# Patient Record
Sex: FEMALE | Race: WHITE | Employment: OTHER | URBAN - METROPOLITAN AREA
[De-identification: names, ages, dates, MRNs, and addresses within clinical notes are randomized per-mention and may not be internally consistent; named-entity substitution may affect disease eponyms.]

---

## 2019-03-14 ENCOUNTER — HOSPITAL ENCOUNTER (OUTPATIENT)
Dept: LAB | Age: 64
Discharge: HOME | End: 2019-03-14
Payer: COMMERCIAL

## 2019-03-14 ENCOUNTER — OFFICE VISIT (OUTPATIENT)
Dept: FAMILY MEDICINE CLINIC | Age: 64
End: 2019-03-14

## 2019-03-14 VITALS
TEMPERATURE: 98 F | DIASTOLIC BLOOD PRESSURE: 68 MMHG | HEIGHT: 63 IN | BODY MASS INDEX: 49.19 KG/M2 | SYSTOLIC BLOOD PRESSURE: 137 MMHG | WEIGHT: 277.6 LBS | OXYGEN SATURATION: 91 % | HEART RATE: 79 BPM | RESPIRATION RATE: 18 BRPM

## 2019-03-14 DIAGNOSIS — J45.909 UNCOMPLICATED ASTHMA, UNSPECIFIED ASTHMA SEVERITY, UNSPECIFIED WHETHER PERSISTENT: ICD-10-CM

## 2019-03-14 DIAGNOSIS — I50.30 DIASTOLIC CONGESTIVE HEART FAILURE, UNSPECIFIED HF CHRONICITY (HCC): ICD-10-CM

## 2019-03-14 DIAGNOSIS — M19.90 ARTHRITIS: ICD-10-CM

## 2019-03-14 DIAGNOSIS — F32.A DEPRESSION, UNSPECIFIED DEPRESSION TYPE: ICD-10-CM

## 2019-03-14 DIAGNOSIS — K21.9 GASTROESOPHAGEAL REFLUX DISEASE, ESOPHAGITIS PRESENCE NOT SPECIFIED: ICD-10-CM

## 2019-03-14 DIAGNOSIS — G56.03 BILATERAL CARPAL TUNNEL SYNDROME: ICD-10-CM

## 2019-03-14 DIAGNOSIS — M85.80 OSTEOPENIA, UNSPECIFIED LOCATION: ICD-10-CM

## 2019-03-14 DIAGNOSIS — E78.00 HYPERCHOLESTEROLEMIA: ICD-10-CM

## 2019-03-14 DIAGNOSIS — K42.9 UMBILICAL HERNIA WITHOUT OBSTRUCTION AND WITHOUT GANGRENE: ICD-10-CM

## 2019-03-14 DIAGNOSIS — G47.30 SLEEP APNEA, UNSPECIFIED TYPE: Primary | ICD-10-CM

## 2019-03-14 DIAGNOSIS — R73.03 PREDIABETES: ICD-10-CM

## 2019-03-14 DIAGNOSIS — Z13.29 SCREENING FOR THYROID DISORDER: ICD-10-CM

## 2019-03-14 DIAGNOSIS — I10 ESSENTIAL HYPERTENSION: ICD-10-CM

## 2019-03-14 DIAGNOSIS — J44.9 CHRONIC OBSTRUCTIVE PULMONARY DISEASE, UNSPECIFIED COPD TYPE (HCC): ICD-10-CM

## 2019-03-14 PROBLEM — E66.01 OBESITY, MORBID (HCC): Status: ACTIVE | Noted: 2019-03-14

## 2019-03-14 LAB
ALBUMIN SERPL-MCNC: 4 G/DL (ref 3.4–5)
ALBUMIN/GLOB SERPL: 1.2 {RATIO} (ref 0.8–1.7)
ALP SERPL-CCNC: 120 U/L (ref 45–117)
ALT SERPL-CCNC: 21 U/L (ref 13–56)
ANION GAP SERPL CALC-SCNC: 7 MMOL/L (ref 3–18)
AST SERPL-CCNC: 11 U/L (ref 15–37)
BASOPHILS # BLD: 0.1 K/UL (ref 0–0.1)
BASOPHILS NFR BLD: 1 % (ref 0–2)
BILIRUB SERPL-MCNC: 0.3 MG/DL (ref 0.2–1)
BUN SERPL-MCNC: 10 MG/DL (ref 7–18)
BUN/CREAT SERPL: 14 (ref 12–20)
CALCIUM SERPL-MCNC: 9 MG/DL (ref 8.5–10.1)
CHLORIDE SERPL-SCNC: 100 MMOL/L (ref 100–108)
CO2 SERPL-SCNC: 32 MMOL/L (ref 21–32)
CREAT SERPL-MCNC: 0.7 MG/DL (ref 0.6–1.3)
DIFFERENTIAL METHOD BLD: ABNORMAL
EOSINOPHIL # BLD: 0.3 K/UL (ref 0–0.4)
EOSINOPHIL NFR BLD: 3 % (ref 0–5)
ERYTHROCYTE [DISTWIDTH] IN BLOOD BY AUTOMATED COUNT: 15.2 % (ref 11.6–14.5)
GLOBULIN SER CALC-MCNC: 3.4 G/DL (ref 2–4)
GLUCOSE SERPL-MCNC: 101 MG/DL (ref 74–99)
HCT VFR BLD AUTO: 49.8 % (ref 35–45)
HGB BLD-MCNC: 15 G/DL (ref 12–16)
LYMPHOCYTES # BLD: 2.4 K/UL (ref 0.9–3.6)
LYMPHOCYTES NFR BLD: 24 % (ref 21–52)
MCH RBC QN AUTO: 28.7 PG (ref 24–34)
MCHC RBC AUTO-ENTMCNC: 30.1 G/DL (ref 31–37)
MCV RBC AUTO: 95.2 FL (ref 74–97)
MONOCYTES # BLD: 0.6 K/UL (ref 0.05–1.2)
MONOCYTES NFR BLD: 6 % (ref 3–10)
NEUTS SEG # BLD: 6.5 K/UL (ref 1.8–8)
NEUTS SEG NFR BLD: 66 % (ref 40–73)
PLATELET # BLD AUTO: 245 K/UL (ref 135–420)
PMV BLD AUTO: 10.2 FL (ref 9.2–11.8)
POTASSIUM SERPL-SCNC: 4.5 MMOL/L (ref 3.5–5.5)
PROT SERPL-MCNC: 7.4 G/DL (ref 6.4–8.2)
RBC # BLD AUTO: 5.23 M/UL (ref 4.2–5.3)
SODIUM SERPL-SCNC: 139 MMOL/L (ref 136–145)
TSH SERPL DL<=0.05 MIU/L-ACNC: 2.2 UIU/ML (ref 0.36–3.74)
WBC # BLD AUTO: 9.8 K/UL (ref 4.6–13.2)

## 2019-03-14 PROCEDURE — 85025 COMPLETE CBC W/AUTO DIFF WBC: CPT

## 2019-03-14 PROCEDURE — 80053 COMPREHEN METABOLIC PANEL: CPT

## 2019-03-14 PROCEDURE — 82043 UR ALBUMIN QUANTITATIVE: CPT

## 2019-03-14 PROCEDURE — 83036 HEMOGLOBIN GLYCOSYLATED A1C: CPT

## 2019-03-14 PROCEDURE — 84443 ASSAY THYROID STIM HORMONE: CPT

## 2019-03-14 PROCEDURE — 83520 IMMUNOASSAY QUANT NOS NONAB: CPT

## 2019-03-14 RX ORDER — VENLAFAXINE HYDROCHLORIDE 75 MG/1
CAPSULE, EXTENDED RELEASE ORAL DAILY
COMMUNITY
End: 2019-03-19 | Stop reason: SDUPTHER

## 2019-03-14 RX ORDER — FUROSEMIDE 20 MG/1
TABLET ORAL DAILY
COMMUNITY
End: 2019-03-19 | Stop reason: SDUPTHER

## 2019-03-14 RX ORDER — ALBUTEROL SULFATE 90 UG/1
2 AEROSOL, METERED RESPIRATORY (INHALATION) AS NEEDED
COMMUNITY
End: 2019-03-14 | Stop reason: SDUPTHER

## 2019-03-14 RX ORDER — CHOLECALCIFEROL (VITAMIN D3) 125 MCG
10 CAPSULE ORAL
COMMUNITY
End: 2019-03-19 | Stop reason: SDUPTHER

## 2019-03-14 RX ORDER — CALC/MAG/B COMPLEX/D3/HERB 61
TABLET ORAL
Qty: 90 CAP | Refills: 1 | Status: SHIPPED | OUTPATIENT
Start: 2019-03-14 | End: 2019-09-18

## 2019-03-14 RX ORDER — TRAZODONE HYDROCHLORIDE 100 MG/1
200 TABLET ORAL
COMMUNITY

## 2019-03-14 RX ORDER — AMLODIPINE BESYLATE 10 MG/1
TABLET ORAL DAILY
COMMUNITY
End: 2019-03-19 | Stop reason: SDUPTHER

## 2019-03-14 RX ORDER — LISINOPRIL AND HYDROCHLOROTHIAZIDE 12.5; 2 MG/1; MG/1
TABLET ORAL DAILY
COMMUNITY
End: 2019-03-19 | Stop reason: SDUPTHER

## 2019-03-14 RX ORDER — ASPIRIN 325 MG
325 TABLET ORAL DAILY
COMMUNITY
End: 2019-08-14 | Stop reason: ALTCHOICE

## 2019-03-14 RX ORDER — ATORVASTATIN CALCIUM 20 MG/1
TABLET, FILM COATED ORAL DAILY
COMMUNITY
End: 2019-03-19 | Stop reason: SDUPTHER

## 2019-03-14 RX ORDER — MONTELUKAST SODIUM 10 MG/1
10 TABLET ORAL DAILY
COMMUNITY
End: 2019-03-19 | Stop reason: SDUPTHER

## 2019-03-14 RX ORDER — BACLOFEN 10 MG/1
TABLET ORAL
COMMUNITY
End: 2019-04-08 | Stop reason: SDUPTHER

## 2019-03-14 RX ORDER — DEXLANSOPRAZOLE 60 MG/1
30 CAPSULE, DELAYED RELEASE ORAL
COMMUNITY
End: 2019-03-14 | Stop reason: ALTCHOICE

## 2019-03-14 RX ORDER — DIPHENHYDRAMINE HCL 25 MG
50 CAPSULE ORAL 2 TIMES DAILY
COMMUNITY
End: 2019-05-24 | Stop reason: SDUPTHER

## 2019-03-14 RX ORDER — OXYCODONE AND ACETAMINOPHEN 5; 325 MG/1; MG/1
TABLET ORAL
COMMUNITY
Start: 2020-01-08 | End: 2020-01-08

## 2019-03-14 RX ORDER — CELECOXIB 200 MG/1
CAPSULE ORAL 2 TIMES DAILY
COMMUNITY
End: 2019-05-24 | Stop reason: SDUPTHER

## 2019-03-14 RX ORDER — ALBUTEROL SULFATE 90 UG/1
2 AEROSOL, METERED RESPIRATORY (INHALATION) AS NEEDED
Qty: 1 INHALER | Refills: 2 | Status: SHIPPED | OUTPATIENT
Start: 2019-03-14 | End: 2019-05-24

## 2019-03-14 NOTE — PROGRESS NOTES
ESTABLISH CARE VISIT    SUBJECTIVE:     Chief Complaint   Patient presents with    Establish Care    Sleep Apnea     Needs a referral to see Dr Lise Paulino (sleep provider)       HPI: 58 y.o. female with PMHx significant for thyroid disease, COPD, HTN, GERD, CHF, asthma  is here for the above chief complaint(s). Asthma/COPD  Patent currently taking spiriva 18mcg BID, albuterol prn and singulair 10mg daily. Patient has been on O2 in the past, she only has a portable o2 tank now, but can't use this at night. Arthritis/Osteopenia  Patient states arthritis in multiple joints. Patient has DJD and degenerative disc disease. Patient was taking ibuprofen 800mg for 10 years and still takes it intermittently. Patient was switched to celebrex, but insurance is not paying for this, so she is just dealing with the pain. DEXA scan in 2013 showed osteopenia. Recent fracture and dislocation of right foot  Patient in boot today. She had surgery in January. Patient is in a boot for at least 2 more months. Follows up with ortho in the next 2 weeks. Sleep Apnea  Patient has sleep apnea- diagnosed by Dr. Lise Paulino several years ago. Patient has CPAP machine, but it is old. Patient takes trazodone at night for sleep. Needs referral for new machine. Hypertension  Patient takes lisinopril/HCTZ 20/12.5mg daily and amlodipine 10mg daily. Last /60- this is typical for her. She does a low salt diet. Hyperlipidemia  Patient takes atorvastatin for this. Unsure when last lipid panel was. Pre-diabetes/diabetes  Last a1c was 6.5 done in January. Diabetes is currently diet-controlled, no current medications. GERD  Patient states that she has \"severe\" GERD. She currently takes dexilant and does not feel controlled on this medication. She was taking lansoprazole in the past and felt better controlled on this medication. Patent states that she has chronically been on high dose NSAIDs for the past 10 years for arthritis.  Patient has ongoing heartburn, epigastric pain. She denies any n/v/d, rectal bleeding, melena. Patient has never had EGD. Bilateral carpal tunnel syndrome  Patient has chronic bilatearl carpal tunnel. She is managing conservatively for now. Requesting wrist splints. Diastolic congestive heart failure  Diagnosed by the ED. Last ECHO that is in her chart shows an EF of 60% Has not seen cardiologist for this. She does endorse shortness of breath with exertion and at rest.-     Hernia  Patient noticed abdominal hernia approximately 5 years ago. It seems to have grown in size. She does endorse occasional hardness and pain to the area. Health Maintenance:     Eye -  no complaints, last eye exam: in the last year, no complaints  Oral Health -  no complaints, last exam:   Hearing -  no complaints. U/A -  no UTI sxs. Cardiac- denies history, denies chest pain. Pulmonary health/Smoking history- , denies cough, SOB.- Smoking - Current smoker. 70 pack year history, qualifies for lung cancer screening. Discussed smoking cessation at length. Explained to patient that her COPD will continue to worsen if she doesn't quit smoking. She is also at very high risk for lung cancer. Patient has tried to quit in the past but finds it difficult to quit when  is still smoking. Mammo- 8/2018  DEXA  - osteopenia - 2013   Colonoscopy - 10 years ago, needs referral to GI      Review of Systems   Constitutional: Negative for chills, diaphoresis, fever, malaise/fatigue and weight loss. HENT: Negative for congestion, ear pain, hearing loss, nosebleeds, sinus pain, sore throat and tinnitus. Eyes: Negative for blurred vision, double vision, pain and discharge. Respiratory: Positive for shortness of breath and wheezing. Negative for cough and hemoptysis. Cardiovascular: Positive for orthopnea. Negative for chest pain, palpitations, claudication, leg swelling and PND.    Gastrointestinal: Positive for heartburn (h/o GERD). Negative for abdominal pain, blood in stool, constipation, diarrhea, melena, nausea and vomiting. Genitourinary: Negative for dysuria, flank pain, frequency, hematuria and urgency. Musculoskeletal: Positive for back pain and joint pain (bilaterl wrists). Negative for myalgias and neck pain. Skin: Negative for itching and rash. Neurological: Negative for dizziness, tingling, speech change, focal weakness, seizures, weakness and headaches. Endo/Heme/Allergies: Negative for polydipsia. Does not bruise/bleed easily. Psychiatric/Behavioral: Negative for depression, memory loss, substance abuse and suicidal ideas. The patient is not nervous/anxious and does not have insomnia. @[facility]  Current Outpatient Medications   Medication Sig    traZODone (DESYREL) 100 mg tablet Take 200 mg by mouth nightly.  atorvastatin (LIPITOR) 20 mg tablet Take  by mouth daily.  montelukast (SINGULAIR) 10 mg tablet Take 10 mg by mouth daily.  melatonin tab tablet Take 10 mg by mouth nightly.  furosemide (LASIX) 20 mg tablet Take  by mouth daily.  celecoxib (CELEBREX) 200 mg capsule Take  by mouth two (2) times a day.  venlafaxine-SR (EFFEXOR XR) 75 mg capsule Take  by mouth daily.  dexlansoprazole (DEXILANT) 60 mg CpDB capsule (delayed release) Take 30 mg by mouth.  pitavastatin calcium (LIVALO) 2 mg tablet Take  by mouth daily.  lisinopril-hydroCHLOROthiazide (PRINZIDE, ZESTORETIC) 20-12.5 mg per tablet Take  by mouth daily.  amLODIPine (NORVASC) 10 mg tablet Take  by mouth daily.  tiotropium (SPIRIVA WITH HANDIHALER) 18 mcg inhalation capsule Take 1 Cap by inhalation daily.  potassium 99 mg tablet Take 99 mg by mouth daily.  baclofen (LIORESAL) 10 mg tablet Take  by mouth three (3) times daily as needed.  diphenhydrAMINE (BENADRYL) 25 mg capsule Take 50 mg by mouth two (2) times a day.     oxyCODONE-acetaminophen (PERCOCET) 5-325 mg per tablet Take  by mouth every six (6) hours as needed for Pain.  albuterol (PROAIR HFA) 90 mcg/actuation inhaler Take 2 Puffs by inhalation as needed for Wheezing.  aspirin (ASPIRIN) 325 mg tablet Take 325 mg by mouth daily.  Oxygen     bipap machine kit by Does Not Apply route. No current facility-administered medications for this visit. Health Maintenance   Topic Date Due    Hepatitis C Screening  1955    DTaP/Tdap/Td series (1 - Tdap) 07/18/1975    PAP AKA CERVICAL CYTOLOGY  07/18/1975    Shingrix Vaccine Age 50> (1 of 2) 07/18/2004    BREAST CANCER SCRN MAMMOGRAM  07/18/2004    FOBT Q 1 YEAR AGE 50-75  07/18/2004    Influenza Age 5 to Adult  08/01/2018       Medications and Allergies: Reviewed and confirmed in the chart    Past Medical Hx: Reviewed and confirmed in the chart  Past Medical History:   Diagnosis Date    Arthritis     Asthma     Carpal tunnel syndrome     [facility] obstructive pulmonary disease [facility] [address]     GERD (gastroesophageal reflux disease)     Hypercholesterolemia     Hypertension     Osteopenia     Sleep apnea     Tendonitis        Patient Active Problem List   Diagnosis Code    Obesity, morbid ([facility] [address] E66.01       Family Hx, Surgical Hx, Social Hx: Reviewed and updated in EMR    OBJECTIVE:  Vitals:    03/14/19 1112   BP: 137/68   Pulse: 79   Resp: 18   Temp: 98 °F (36.7 °C)   TempSrc: Oral   SpO2: 91%   Weight: 277 lb 9.6 oz (125.9 kg)   Height: 5' 3\" (1.6 m)       BP Readings from Last 3 Encounters:   03/14/19 137/68     Wt Readings from Last 3 Encounters:   03/14/19 277 lb 9.6 oz (125.9 kg)       Physical Exam   Constitutional: She is well-developed, well-nourished, and in no distress. HENT:   Head: Normocephalic and atraumatic. Right Ear: External ear normal.   Left Ear: External ear normal.   Nose: Nose normal.   Mouth/Throat: Oropharynx is clear and moist.   Eyes: Conjunctivae and EOM are normal. Pupils are equal, round, and reactive to light. Neck: Normal range of motion.  Neck supple. No tracheal deviation present. No thyromegaly present. Cardiovascular: Normal rate, regular rhythm and intact distal pulses. Exam reveals distant heart sounds. Exam reveals no gallop and no friction rub. No murmur heard. Abdominal: Soft. Normal aorta. There is tenderness. A hernia is present. Hernia confirmed positive in the umbilical area. Tenderness in area of hernia. Musculoskeletal: Normal range of motion. She exhibits no edema, tenderness or deformity. Lymphadenopathy:     She has no cervical adenopathy. Skin: Skin is warm and dry. Psychiatric: Mood, memory, affect and judgment normal.         Nursing Notes Reviewed    ASSESSMENT AND PLAN  Diagnoses and all orders for this visit:    1. Sleep apnea, unspecified type  -     SLEEP MEDICINE REFERRAL    2. Essential hypertension       - continue with current management  Key CAD CHF Meds             atorvastatin (LIPITOR) 20 mg tablet  (Taking) Take  by mouth daily. furosemide (LASIX) 20 mg tablet  (Taking) Take  by mouth daily. pitavastatin calcium (LIVALO) 2 mg tablet  (Taking) Take  by mouth daily. lisinopril-hydroCHLOROthiazide (PRINZIDE, ZESTORETIC) 20-12.5 mg per tablet  (Taking) Take  by mouth daily. amLODIPine (NORVASC) 10 mg tablet  (Taking) Take  by mouth daily. aspirin (ASPIRIN) 325 mg tablet  (Taking) Take 325 mg by mouth daily. 3. Gastroesophageal reflux disease, esophagitis presence not specified  -     REFERRAL TO GASTROENTEROLOGY  -     METABOLIC PANEL, COMPREHENSIVE; Future  -     lansoprazole (PREVACID) 15 mg capsule; One cap po daily. 4. Bilateral carpal tunnel syndrome  -     CBC WITH AUTOMATED DIFF; Future  -     Arm Brace (NEOPRENE WRIST SPLINT SUPPORT) misc; To be worn daily. 5. Uncomplicated asthma, unspecified asthma severity, unspecified whether persistent       - See below for COPD    6.  Chronic obstructive pulmonary disease, unspecified COPD type ([facility] [address]  -     REFERRAL TO PULMONARY DISEASE  -     albuterol (PROAIR HFA) 90 mcg/actuation inhaler; Take 2 Puffs by inhalation as needed for Wheezing. 7. Diastolic congestive heart failure, unspecified HF chronicity ([address]  As above for hypertension  -     ECHO ADULT COMPLETE; Future  -     MICROALBUMIN, UR, RAND W/ MICROALB/CREAT RATIO; Future    8. Hypercholesterolemia     - Continue with lipitor 20mg daily. 9. Arthritis  -     RHEUMASSURE; Future    10. Osteopenia, unspecified location  -     DEXA BONE DENSITY STUDY AXIAL; Future    11. Umbilical hernia without obstruction and without gangrene  -      ABD LTD; Future  -     REFERRAL TO GENERAL SURGERY    12. Prediabetes  The patient is asked to make an attempt to improve diet and exercise patterns to aid in medical management of this problem. -     METABOLIC PANEL, COMPREHENSIVE; Future  -     HEMOGLOBIN A1C WITH EAG; Future  -     MICROALBUMIN, UR, RAND W/ MICROALB/CREAT RATIO; Future    13. Screening for thyroid disorder  -     TSH 3RD GENERATION; Future    Follow-up in 2-3 weeks.        Orders Placed This Encounter    traZODone (DESYREL) 100 mg tablet    atorvastatin (LIPITOR) 20 mg tablet    montelukast (SINGULAIR) 10 mg tablet    melatonin tab tablet    furosemide (LASIX) 20 mg tablet    celecoxib (CELEBREX) 200 mg capsule    venlafaxine-SR (EFFEXOR XR) 75 mg capsule    dexlansoprazole (DEXILANT) 60 mg CpDB capsule (delayed release)    pitavastatin calcium (LIVALO) 2 mg tablet    lisinopril-hydroCHLOROthiazide (PRINZIDE, ZESTORETIC) 20-12.5 mg per tablet    amLODIPine (NORVASC) 10 mg tablet    tiotropium (SPIRIVA WITH HANDIHALER) 18 mcg inhalation capsule    potassium 99 mg tablet    baclofen (LIORESAL) 10 mg tablet    diphenhydrAMINE (BENADRYL) 25 mg capsule    oxyCODONE-acetaminophen (PERCOCET) 5-325 mg per tablet    albuterol (PROAIR HFA) 90 mcg/actuation inhaler    aspirin (ASPIRIN) 325 mg tablet    Oxygen    bipap machine kit       No future appointments. AVS printed and provided to patient    Assessment and plan above discussed with patient, patient voiced understanding and agreement with plan. More than 50% of this 45 minute visit was spent face to face counseling the patient about the etiology and treatment options for the above health conditions outlined in assessment and plan     Pam Wong [place] [phone]  [facility] -   [phone]

## 2019-03-14 NOTE — PROGRESS NOTES
Chief Complaint   Patient presents with   Mary Fernandez Establish Care    Sleep Apnea     Needs a referral to see Dr Nikolas Loving (sleep provider)

## 2019-03-15 LAB
CREAT UR-MCNC: 75 MG/DL (ref 30–125)
EST. AVERAGE GLUCOSE BLD GHB EST-MCNC: 140 MG/DL
HBA1C MFR BLD: 6.5 % (ref 4.2–5.6)
MICROALBUMIN UR-MCNC: 0.7 MG/DL (ref 0–3)
MICROALBUMIN/CREAT UR-RTO: 9 MG/G (ref 0–30)

## 2019-03-19 ENCOUNTER — TELEPHONE (OUTPATIENT)
Dept: FAMILY MEDICINE CLINIC | Age: 64
End: 2019-03-19

## 2019-03-19 RX ORDER — AMLODIPINE BESYLATE 10 MG/1
10 TABLET ORAL DAILY
Qty: 90 TAB | Refills: 0 | Status: SHIPPED | OUTPATIENT
Start: 2019-03-19 | End: 2019-12-16 | Stop reason: SDUPTHER

## 2019-03-19 RX ORDER — VENLAFAXINE HYDROCHLORIDE 75 MG/1
75 CAPSULE, EXTENDED RELEASE ORAL DAILY
Qty: 90 CAP | Refills: 0 | Status: SHIPPED | OUTPATIENT
Start: 2019-03-19 | End: 2019-07-02 | Stop reason: SDUPTHER

## 2019-03-19 RX ORDER — MONTELUKAST SODIUM 10 MG/1
10 TABLET ORAL DAILY
Qty: 90 TAB | Refills: 0 | Status: SHIPPED | OUTPATIENT
Start: 2019-03-19 | End: 2019-07-02 | Stop reason: SDUPTHER

## 2019-03-19 RX ORDER — FUROSEMIDE 20 MG/1
20 TABLET ORAL DAILY
Qty: 90 TAB | Refills: 0 | Status: SHIPPED | OUTPATIENT
Start: 2019-03-19 | End: 2019-07-02 | Stop reason: SDUPTHER

## 2019-03-19 RX ORDER — CHOLECALCIFEROL (VITAMIN D3) 125 MCG
10 CAPSULE ORAL
Qty: 90 TAB | Refills: 0 | Status: SHIPPED | OUTPATIENT
Start: 2019-03-19 | End: 2019-03-20 | Stop reason: SDUPTHER

## 2019-03-19 RX ORDER — LISINOPRIL AND HYDROCHLOROTHIAZIDE 12.5; 2 MG/1; MG/1
1 TABLET ORAL DAILY
Qty: 90 TAB | Refills: 0 | Status: SHIPPED | OUTPATIENT
Start: 2019-03-19 | End: 2019-09-06 | Stop reason: SDUPTHER

## 2019-03-19 RX ORDER — ATORVASTATIN CALCIUM 20 MG/1
20 TABLET, FILM COATED ORAL DAILY
Qty: 90 TAB | Refills: 0 | Status: SHIPPED | OUTPATIENT
Start: 2019-03-19 | End: 2019-09-06 | Stop reason: SDUPTHER

## 2019-03-19 NOTE — TELEPHONE ENCOUNTER
Pt requesting all order to be faxed to [facility].  Faxed to [address] [place] [address] confirmation received

## 2019-03-20 ENCOUNTER — TELEPHONE (OUTPATIENT)
Dept: FAMILY MEDICINE CLINIC | Age: 64
End: 2019-03-20

## 2019-03-20 DIAGNOSIS — G47.30 SLEEP APNEA, UNSPECIFIED TYPE: ICD-10-CM

## 2019-03-20 RX ORDER — MELATONIN 5 MG
CAPSULE ORAL
Qty: 180 CAP | Refills: 0 | Status: SHIPPED | OUTPATIENT
Start: 2019-03-20 | End: 2019-07-02 | Stop reason: SDUPTHER

## 2019-03-22 LAB
14.3.3 ETA, RHEUM. ARTHRITIS: <0.2 NG/ML
CCP IGA+IGG SERPL IA-ACNC: <20 UNITS
RHEUMATOID FACT SERPL-ACNC: <14 UNITS/ML

## 2019-04-03 ENCOUNTER — TELEPHONE (OUTPATIENT)
Dept: FAMILY MEDICINE CLINIC | Age: 64
End: 2019-04-03

## 2019-04-08 ENCOUNTER — OFFICE VISIT (OUTPATIENT)
Dept: FAMILY MEDICINE CLINIC | Age: 64
End: 2019-04-08

## 2019-04-08 VITALS
HEIGHT: 63 IN | WEIGHT: 273.6 LBS | SYSTOLIC BLOOD PRESSURE: 132 MMHG | TEMPERATURE: 99.1 F | OXYGEN SATURATION: 92 % | DIASTOLIC BLOOD PRESSURE: 63 MMHG | HEART RATE: 76 BPM | BODY MASS INDEX: 48.48 KG/M2 | RESPIRATION RATE: 18 BRPM

## 2019-04-08 DIAGNOSIS — J34.2 DEVIATED SEPTUM: Primary | ICD-10-CM

## 2019-04-08 DIAGNOSIS — E11.8 CONTROLLED DIABETES MELLITUS TYPE 2 WITH COMPLICATIONS, UNSPECIFIED WHETHER LONG TERM INSULIN USE (HCC): ICD-10-CM

## 2019-04-08 DIAGNOSIS — M62.830 MUSCLE SPASM OF BACK: ICD-10-CM

## 2019-04-08 RX ORDER — METFORMIN HYDROCHLORIDE 500 MG/1
TABLET ORAL
Qty: 270 TAB | Refills: 1 | Status: SHIPPED | OUTPATIENT
Start: 2019-04-08

## 2019-04-08 RX ORDER — BACLOFEN 10 MG/1
10 TABLET ORAL 3 TIMES DAILY
Qty: 90 TAB | Refills: 1 | Status: SHIPPED | OUTPATIENT
Start: 2019-04-08 | End: 2019-06-07 | Stop reason: SDUPTHER

## 2019-04-08 NOTE — PROGRESS NOTES
Chief Complaint   Patient presents with    Results     lab     1. Have you been to the ER, urgent care clinic since your last visit? Hospitalized since your last visit? No    2. Have you seen or consulted any other health care providers outside of the [address] [place] [address] since your last visit? Include any pap smears or colon screening.  No

## 2019-04-08 NOTE — PATIENT INSTRUCTIONS
Noninsulin Medicines for Type 2 Diabetes: Care Instructions  Your Care Instructions    There are different types of noninsulin medicines for diabetes. Each works in a different way. But they all help you control your blood sugar. Some types help your body make insulin to lower your blood sugar. Others lower how much insulin your body needs. Some can slow how fast your body digests sugars. And some can remove extra glucose through your urine. · Alpha-glucosidase inhibitors. These keep starches from breaking down. This means that they lower the amount of glucose absorbed when you eat. They don't help your body make more insulin. So they will not cause low blood sugar unless you use them with other medicines for diabetes. They include acarbose and miglitol. · DPP-4 inhibitors. These help your body raise the level of insulin after you eat. They also help your body make less of a hormone that raises blood sugar. They include linagliptin, saxagliptin, and sitagliptin. · Incretin hormones (GLP-1 receptor agonists). Your body makes a protein that can raise your insulin level. It also can lower your blood sugar and make you less hungry. You can get shots of hormones that work the same way. They include exenatide and liraglutide. · Meglitinides. These help your body release insulin. They also help slow how your body digests sugars. So they can keep your blood sugar from rising too fast after you eat. They include nateglinide and repaglinide. · Metformin. This lowers how much glucose your liver makes. And it helps you respond better to insulin. It also lowers the amount of stored sugar that your liver releases when you are not eating. · SGLT2 inhibitors. These help to remove extra glucose through your urine. They may also help some people lose weight. They include canagliflozin, dapagliflozin, and empagliflozin. · Sulfonylureas. These help your body release more insulin. Some work for many hours.  They can cause low blood sugar if you don't eat as you planned. They include glipizide and glyburide. · Thiazolidinediones. These reduce the amount of blood glucose. They also help you respond better to insulin. They include pioglitazone and rosiglitazone. You may need to take more than one medicine for diabetes. Two or more medicines may work better to lower your blood sugar level than just one does. Follow-up care is a key part of your treatment and safety. Be sure to make and go to all appointments, and call your doctor if you are having problems. It's also a good idea to know your test results and keep a list of the medicines you take. How can you care for yourself at home? · Eat a healthy diet. Get some exercise each day. This may help you to reduce how much medicine you need. · Do not take other prescription or over-the-counter medicines, vitamins, herbal products, or supplements without talking to your doctor first. Some medicines for type 2 diabetes can cause problems with other medicines or supplements. · Tell your doctor if you plan to get pregnant. Some of these drugs are not safe for pregnant women. · Be safe with medicines. Take your medicines exactly as prescribed. Meglitinides and sulfonylureas can cause your blood sugar to drop very low. Call your doctor if you think you are having a problem with your medicine. · Check your blood sugar often. You can use a glucose monitor. Keeping track can help you know how certain foods, activities, and medicines affect your blood sugar. And it can help you keep your blood sugar from getting so low that it's not safe. When should you call for help? Call 911 anytime you think you may need emergency care.  For example, call if:    · You passed out (lost consciousness).     · You are confused or cannot think clearly.     · Your blood sugar is very high or very low.    Watch closely for changes in your health, and be sure to contact your doctor if:    · Your blood sugar stays outside the level your doctor set for you.     · You have any problems. Where can you learn more? Go to http://morgan-laz.info/. Enter H153 in the search box to learn more about \"Noninsulin Medicines for Type 2 Diabetes: Care Instructions. \"  Current as of: July 25, 2018  Content Version: 11.9  © 8356-0052 [employer]. Care instructions adapted under license by [employer] (which disclaims liability or warranty for this information). If you have questions about a medical condition or this instruction, always ask your healthcare professional. [address] any warranty or liability for your use of this information. Learning About Diabetes Food Guidelines  Your Care Instructions    Meal planning is important to manage diabetes. It helps keep your blood sugar at a target level (which you set with your doctor). You don't have to eat special foods. You can eat what your family eats, including sweets once in a while. But you do have to pay attention to how often you eat and how much you eat of certain foods. You may want to work with a dietitian or a certified diabetes educator (CDE) to help you plan meals and snacks. A dietitian or CDE can also help you lose weight if that is one of your goals. What should you know about eating carbs? Managing the amount of carbohydrate (carbs) you eat is an important part of healthy meals when you have diabetes. Carbohydrate is found in many foods. · Learn which foods have carbs. And learn the amounts of carbs in different foods. ? Bread, cereal, pasta, and rice have about 15 grams of carbs in a serving. A serving is 1 slice of bread (1 ounce), ½ cup of cooked cereal, or 1/3 cup of cooked pasta or rice. ? Fruits have 15 grams of carbs in a serving.  A serving is 1 small fresh fruit, such as an apple or orange; ½ of a banana; ½ cup of cooked or canned fruit; ½ cup of fruit juice; 1 cup of melon or raspberries; or 2 tablespoons of dried fruit. ? Milk and no-sugar-added yogurt have 15 grams of carbs in a serving. A serving is 1 cup of milk or 2/3 cup of no-sugar-added yogurt. ? Starchy vegetables have 15 grams of carbs in a serving. A serving is ½ cup of mashed potatoes or sweet potato; 1 cup winter squash; ½ of a small baked potato; ½ cup of cooked beans; or ½ cup cooked corn or green peas. · Learn how much carbs to eat each day and at each meal. A dietitian or CDE can teach you how to keep track of the amount of carbs you eat. This is called carbohydrate counting. · If you are not sure how to count carbohydrate grams, use the Plate Method to plan meals. It is a good, quick way to make sure that you have a balanced meal. It also helps you spread carbs throughout the day. ? Divide your plate by types of foods. Put non-starchy vegetables on half the plate, meat or other protein food on one-quarter of the plate, and a grain or starchy vegetable in the final quarter of the plate. To this you can add a small piece of fruit and 1 cup of milk or yogurt, depending on how many carbs you are supposed to eat at a meal.  · Try to eat about the same amount of carbs at each meal. Do not \"save up\" your daily allowance of carbs to eat at one meal.  · Proteins have very little or no carbs per serving. Examples of proteins are beef, chicken, turkey, fish, eggs, tofu, cheese, cottage cheese, and peanut butter. A serving size of meat is 3 ounces, which is about the size of a deck of cards. Examples of meat substitute serving sizes (equal to 1 ounce of meat) are 1/4 cup of cottage cheese, 1 egg, 1 tablespoon of peanut butter, and ½ cup of tofu. How can you eat out and still eat healthy? · Learn to estimate the serving sizes of foods that have carbohydrate. If you measure food at home, it will be easier to estimate the amount in a serving of restaurant food.   · If the meal you order has too much carbohydrate (such as potatoes, corn, or baked beans), ask to have a low-carbohydrate food instead. Ask for a salad or green vegetables. · If you use insulin, check your blood sugar before and after eating out to help you plan how much to eat in the future. · If you eat more carbohydrate at a meal than you had planned, take a walk or do other exercise. This will help lower your blood sugar. What else should you know? · Limit saturated fat, such as the fat from meat and dairy products. This is a healthy choice because people who have diabetes are at higher risk of heart disease. So choose lean cuts of meat and nonfat or low-fat dairy products. Use olive or canola oil instead of butter or shortening when cooking. · Don't skip meals. Your blood sugar may drop too low if you skip meals and take insulin or certain medicines for diabetes. · Check with your doctor before you drink alcohol. Alcohol can cause your blood sugar to drop too low. Alcohol can also cause a bad reaction if you take certain diabetes medicines. Follow-up care is a key part of your treatment and safety. Be sure to make and go to all appointments, and call your doctor if you are having problems. It's also a good idea to know your test results and keep a list of the medicines you take. Where can you learn more? Go to http://morgan-laz.info/. Enter W846 in the search box to learn more about \"Learning About Diabetes Food Guidelines. \"  Current as of: July 25, 2018  Content Version: 11.9  © 7648-7486 [employer]. Care instructions adapted under license by [employer] (which disclaims liability or warranty for this information). If you have questions about a medical condition or this instruction, always ask your healthcare professional. [place] [address] any warranty or liability for your use of this information.

## 2019-04-08 NOTE — PROGRESS NOTES
Follow Up Visit Note    Chief Complaint   Patient presents with    Results     lab       HPI:  Roxie Cano is a 58 y.o.  female  has a past medical history of Arthritis, Asthma, Carpal tunnel syndrome, Chronic obstructive pulmonary disease ([facility] [address] Congestive heart failure (CHF) ([facility] [address] COPD (chronic obstructive pulmonary disease) ([facility] [address] GERD (gastroesophageal reflux disease), Hypercholesterolemia, Hypertension, Hypothyroid, Osteopenia, Sleep apnea, Tendonitis, and Thyroid disease. is here for the above complaint(s). Lab Review:  Sodium 139   136 - 145 mmol/L Final   Potassium 4.5   3.5 - 5.5 mmol/L Final   Chloride 100   100 - 108 mmol/L Final   CO2 32   21 - 32 mmol/L Final   Anion gap 7   3.0 - 18 mmol/L Final   Glucose 101  High   74 - 99 mg/dL Final   BUN 10   7.0 - 18 MG/DL Final   Creatinine 0.70   0.6 - 1.3 MG/DL Final   BUN/Creatinine ratio 14   12 - 20   Final   GFR est AA >60   >60 ml/min/1.73m2 Final   GFR est non-AA >60   >60 ml/min/1.73m2 Final     Discussed patient's elevated a1c with her. She states that her a1c has been \"borderline\" in the past and her previous PCP has been monitoring this. She is not currently taking anything for pre-diabetes/diabetes at this time. Patient denies frequent urination, excessive thirst, hypoglycemic events (lightheadedness/dizziness), nausea, vomiting, shakiness. Hemoglobin A1c 6.5  High   4.2 - 5.6 % Final     Low back pain:  Patient states arthritis in multiple joints, including DJD and degenerative disc disease. Patient is having acute pain today and endorses 6/10 pain in her lower back, radiates down her right side. Pain comes and goes and is stabbing/throbbing in nature when it hits her. She states that movement makes this pain worse as well as pain after she has been sitting for awhile and moves to standing position. Patient denies any numbness/tingling in her legs or feet. Deviated Nasal Septum  Patient states this is chronic.  She endorses snoring and sleep apnea-like symptoms. She is requesting referral for ENT for evaluation. She states that she has difficulty breathing through both sides of her nose. She denies any pain, sinus congestion or tenderness. Review of Systems   Constitutional: Negative for chills, fever, malaise/fatigue and weight loss. Eyes: Negative for blurred vision, double vision and pain. Respiratory: Negative for cough, sputum production, shortness of breath and wheezing. Cardiovascular: Negative for chest pain, palpitations, orthopnea, claudication and leg swelling. Gastrointestinal: Negative for abdominal pain, constipation, diarrhea, nausea and vomiting. Genitourinary: Negative for dysuria, frequency and urgency. Neurological: Negative for dizziness, tingling and headaches. Current Outpatient Medications   Medication Sig    melatonin 5 mg cap capsule Take 2 capsules by mouth at bedtime.  atorvastatin (LIPITOR) 20 mg tablet Take 1 Tab by mouth daily.  montelukast (SINGULAIR) 10 mg tablet Take 1 Tab by mouth daily.  furosemide (LASIX) 20 mg tablet Take 1 Tab by mouth daily.  venlafaxine-SR (EFFEXOR XR) 75 mg capsule Take 1 Cap by mouth daily.  lisinopril-hydroCHLOROthiazide (PRINZIDE, ZESTORETIC) 20-12.5 mg per tablet Take 1 Tab by mouth daily.  amLODIPine (NORVASC) 10 mg tablet Take 1 Tab by mouth daily.  tiotropium (SPIRIVA WITH HANDIHALER) 18 mcg inhalation capsule Take 1 Cap by inhalation daily.  potassium 99 mg tablet Take 1 Tab by mouth daily.  celecoxib (CELEBREX) 200 mg capsule Take  by mouth two (2) times a day.  baclofen (LIORESAL) 10 mg tablet Take  by mouth three (3) times daily as needed.  diphenhydrAMINE (BENADRYL) 25 mg capsule Take 50 mg by mouth two (2) times a day.  aspirin (ASPIRIN) 325 mg tablet Take 325 mg by mouth daily.  albuterol (PROAIR HFA) 90 mcg/actuation inhaler Take 2 Puffs by inhalation as needed for Wheezing.     lansoprazole (PREVACID) 15 mg capsule One cap po daily.  traZODone (DESYREL) 100 mg tablet Take 200 mg by mouth nightly.  traZODone (DESYREL) 100 mg tablet Take 200 mg by mouth nightly.  oxyCODONE-acetaminophen (PERCOCET) 5-325 mg per tablet Take  by mouth every six (6) hours as needed for Pain.  Oxygen     bipap machine kit by Does Not Apply route.  Arm Brace (NEOPRENE WRIST SPLINT SUPPORT) misc To be worn daily.  celecoxib (CELEBREX) 200 mg capsule Take 200 mg by mouth two (2) times a day.  diphenhydrAMINE (BENADRYL) 25 mg capsule Take 25 mg by mouth every six (6) hours as needed.  oxyCODONE-acetaminophen (PERCOCET) 5-325 mg per tablet Take 1 Tab by mouth every six (6) hours as needed for Pain. Max Daily Amount: 4 Tabs.  lisinopril-hydroCHLOROthiazide (PRINZIDE, ZESTORETIC) 10-12.5 mg per tablet Take 1 Tab by mouth daily.  albuterol (PROAIR HFA) 90 mcg/actuation inhaler Take 2 Puffs by inhalation every six (6) hours as needed for Wheezing. No current facility-administered medications for this visit. Health Maintenance   Topic Date Due    Hepatitis C Screening  1955    FOOT EXAM Q1  07/18/1964    EYE EXAM RETINAL OR DILATED  07/18/1964    PAP AKA CERVICAL CYTOLOGY  07/18/1975    Shingrix Vaccine Age 50> (1 of 2) 07/18/2004    FOBT Q 1 YEAR AGE 50-75  07/18/2004    LIPID PANEL Q1  07/13/2019    Influenza Age 5 to Adult  08/01/2019    HEMOGLOBIN A1C Q6M  09/14/2019    DTaP/Tdap/Td series (2 - Td) 12/07/2019    MICROALBUMIN Q1  03/14/2020    BREAST CANCER SCRN MAMMOGRAM  09/20/2020    Pneumococcal 0-64 years  Completed     Immunization History   Administered Date(s) Administered    Influenza Vaccine (Quad) PF 12/10/2016       Allergies and Medications: Reviewed and updated in EMR.      Past Medical History:   Diagnosis Date    Arthritis     Asthma     Carpal tunnel syndrome     Chronic obstructive pulmonary disease (HCC)     Congestive heart failure (CHF) (HCC)     COPD (chronic obstructive pulmonary disease) (HCC)     GERD (gastroesophageal reflux disease)     Hypercholesterolemia     Hypertension     Hypothyroid     Osteopenia     Sleep apnea     Tendonitis     Thyroid disease        Surgical History: Reviewed and updated in EMR as appropriate. Social History: Reviewed and updated in EMR as appropriate. Family History: Reviewed and updated in EMR as appropriate. OBJECTIVE:   Visit Vitals  /63   Pulse 76   Temp 99.1 °F (37.3 °C) (Oral)   Resp 18   Ht 5' 3\" (1.6 m)   Wt 273 lb 9.6 oz (124.1 kg)   SpO2 92%   BMI 48.47 kg/m²        Physical Exam   Constitutional: She is oriented to person, place, and time and well-developed, well-nourished, and in no distress. No distress. Neck: Normal range of motion. Neck supple. No thyromegaly present. Cardiovascular: Normal rate, regular rhythm, normal heart sounds and intact distal pulses. Exam reveals no gallop and no friction rub. No murmur heard. Pulmonary/Chest: Effort normal and breath sounds normal. No respiratory distress. She has no wheezes. She has no rales. She exhibits no tenderness. Lymphadenopathy:     She has no cervical adenopathy. Neurological: She is alert and oriented to person, place, and time. Skin: Skin is warm and dry. She is not diaphoretic. Psychiatric: Mood, memory, affect and judgment normal.   Nursing note and vitals reviewed.       LABS/RADIOLOGICAL TESTS:  Lab Results   Component Value Date/Time    WBC 9.8 03/14/2019 12:45 PM    HGB 15.0 03/14/2019 12:45 PM    HCT 49.8 (H) 03/14/2019 12:45 PM    PLATELET 734 91/34/8739 12:45 PM     Lab Results   Component Value Date/Time    Sodium 139 03/14/2019 12:45 PM    Potassium 4.5 03/14/2019 12:45 PM    Chloride 100 03/14/2019 12:45 PM    CO2 32 03/14/2019 12:45 PM    Glucose 101 (H) 03/14/2019 12:45 PM    BUN 10 03/14/2019 12:45 PM    Creatinine 0.70 03/14/2019 12:45 PM     Lab Results   Component Value Date/Time    Cholesterol, total 167 07/13/2018 09:35 AM    HDL Cholesterol 46 07/13/2018 09:35 AM    LDL, calculated 94 07/13/2018 09:35 AM    Triglyceride 133 07/13/2018 09:35 AM     No results found for: GPT  Lab Results   Component Value Date/Time    Hemoglobin A1c 6.5 (H) 03/14/2019 12:45 PM         All lab results and radiological studies were reviewed and discussed with the patient. ASSESSMENT/PLAN:      ICD-10-CM ICD-9-CM    1. Deviated septum J34.2 470 REFERRAL TO ENT-OTOLARYNGOLOGY   2. Controlled diabetes mellitus type 2 with complications, unspecified whether long term insulin use (HCC) E11.8 250.90 metFORMIN (GLUCOPHAGE) 500 mg tablet  Monitor glucose levels at home. Bring in glucose log to next visit. Repeat a1c in 3-4 months. 3. Muscle spasm of back M62.830 724.8 baclofen (LIORESAL) 10 mg tablet         Patient verbalized understanding and agreement with the plan. Patient was given an after-visit summary. Follow-up in 3 months or sooner if worsening symptoms. A total of 30 minutes were spent face-to-face with the patient during this encounter and over half of that time was spent on counseling and coordination of care. We discussed in depth the importance of managing diabetes, including nutrition and exercise, preventive care (foot exams, eye exams, yearly lipid panel and urine microalbumin. Pam Mortensen PA-C  [address], [address] [place] [address], [address] [place] [address] [address] [place] [address] [phone]

## 2019-04-11 ENCOUNTER — TELEPHONE (OUTPATIENT)
Dept: FAMILY MEDICINE CLINIC | Age: 64
End: 2019-04-11

## 2019-04-11 NOTE — TELEPHONE ENCOUNTER
Patient informed of lab results detailed in providers note. Patient states that she has appt scheduled for Monday with the general surgeon.

## 2019-04-11 NOTE — TELEPHONE ENCOUNTER
Please let patient know that her ECHO was normal and her DEXA scan showed no osteopenia or osteoporosis (thinning of the bones). Her abdominal ultrasound showed a 1.3cm hernia that expands to 4.3cm when pressure was added. I have forwarded the results to the general surgeon that she will be seeing for this. Please make sure she has an appointment scheduled. Thank you. Pam Mortensen PA-C  [facility] [phone]  [address]

## 2019-05-24 ENCOUNTER — OFFICE VISIT (OUTPATIENT)
Dept: PULMONOLOGY | Age: 64
End: 2019-05-24

## 2019-05-24 VITALS
SYSTOLIC BLOOD PRESSURE: 130 MMHG | HEIGHT: 63 IN | WEIGHT: 273 LBS | BODY MASS INDEX: 48.37 KG/M2 | TEMPERATURE: 99 F | RESPIRATION RATE: 21 BRPM | DIASTOLIC BLOOD PRESSURE: 64 MMHG | OXYGEN SATURATION: 96 % | HEART RATE: 77 BPM

## 2019-05-24 DIAGNOSIS — R91.8 PULMONARY INFILTRATE: ICD-10-CM

## 2019-05-24 DIAGNOSIS — J45.40 MODERATE PERSISTENT ASTHMA WITHOUT COMPLICATION: ICD-10-CM

## 2019-05-24 DIAGNOSIS — G47.33 OSA ON CPAP: ICD-10-CM

## 2019-05-24 DIAGNOSIS — Z87.891 PERSONAL HISTORY OF TOBACCO USE, PRESENTING HAZARDS TO HEALTH: ICD-10-CM

## 2019-05-24 DIAGNOSIS — R09.02 HYPOXIA: ICD-10-CM

## 2019-05-24 DIAGNOSIS — F17.210 NICOTINE DEPENDENCE, CIGARETTES, UNCOMPLICATED: ICD-10-CM

## 2019-05-24 DIAGNOSIS — R06.09 DYSPNEA ON EXERTION: ICD-10-CM

## 2019-05-24 DIAGNOSIS — J44.9 COPD, GROUP B, BY GOLD 2017 CLASSIFICATION (HCC): Primary | ICD-10-CM

## 2019-05-24 DIAGNOSIS — Z01.811 PREOP PULMONARY/RESPIRATORY EXAM: ICD-10-CM

## 2019-05-24 DIAGNOSIS — Z99.89 OSA ON CPAP: ICD-10-CM

## 2019-05-24 DIAGNOSIS — R06.02 SHORTNESS OF BREATH: ICD-10-CM

## 2019-05-24 DIAGNOSIS — E66.01 MORBID OBESITY (HCC): ICD-10-CM

## 2019-05-24 RX ORDER — ALBUTEROL SULFATE 90 UG/1
1-2 AEROSOL, METERED RESPIRATORY (INHALATION)
Qty: 1 INHALER | Refills: 5 | Status: SHIPPED | OUTPATIENT
Start: 2019-05-24 | End: 2020-02-21 | Stop reason: SDUPTHER

## 2019-05-24 RX ORDER — BUDESONIDE AND FORMOTEROL FUMARATE DIHYDRATE 160; 4.5 UG/1; UG/1
2 AEROSOL RESPIRATORY (INHALATION) 2 TIMES DAILY
Qty: 1 INHALER | Refills: 5 | Status: SHIPPED | OUTPATIENT
Start: 2019-05-24 | End: 2020-04-09 | Stop reason: SDUPTHER

## 2019-05-24 RX ORDER — BUDESONIDE AND FORMOTEROL FUMARATE DIHYDRATE 160; 4.5 UG/1; UG/1
2 AEROSOL RESPIRATORY (INHALATION) 2 TIMES DAILY
Qty: 1 INHALER | Refills: 0 | Status: SHIPPED | COMMUNITY
Start: 2019-05-24 | End: 2020-01-08 | Stop reason: SDUPTHER

## 2019-05-24 NOTE — PROGRESS NOTES
[address] [facility] [place] [address] [facility]  Pulmonary, Critical Care, and Sleep Medicine    Pulmonary Office Initial Consultation  Name: Barrington Mendez 58 y.o. female  MRN: [id number]  : 1955  Service Date: 19    Referring Provider: [facility], [address]  Chief Complaint:   Chief Complaint   Patient presents with    COPD     Med. Clearance EMILY Boggs Umbilical hernia repair  Merrilyn Iha today, Echo 4/10    Asthma       History of Present Illness:  Barrington Mendez is a 58 y.o. female, who presents to Pulmonary clinic referred for pulmonary clearance for upcoming umbilical hernia repair. This is planned to be done at [facility], patient cannot recall name of surgeon. Surgery is planned for 2019. Pt denies any lung problems. Pt reports she was diagnosed with asthma in late . Pt reports that she was on Spiriva throughout that time and a rescue inhaler. Pt reports using it very infrequently -- reports using it about 4-5x a year. She reports significant dyspnea with exposure to heat as well as exposure to heavy fragrances. Pt has a hx of allergies, sinus congestion, itchy watery eyes. Pt reports it is year around with dust, termites, and pollen. Pt reports taking Benadryl twice a day and Singulair. Pt reports taking Singulair for years. Symptoms: wheezing, chest tightness, dyspnea, nonproductive cough  Triggers: pollen, perfumes, ammonia, dust, secondhand smoke  Nocturnal awakenings: nightly    Patient reports dyspnea with mild exertion- pt unable to walk to the corner. Pt gets short of breath with ADLs -- mMRC 3-4. Progressively worsening over the last few years. Only alleviated by rest, no other modifying factors. Of note, patient reports she has had one hospitalization about 4 years ago for CHF exacerbation.   Since that time, patient reports she saw cardiology and has not seen them in over a year. Per the patient, she does not have any cardiac problems she knows about. Occ Hx: worked at [employer] in [employer], prior [address]. No exposure to cold/silica/asbestos. Smoking Hx: 1.75PPD, prior 3PPD smoker    Pt reports she has a hx of REAL, wearing CPAP for over 10 years. Pt reports she had another home based sleep study. Pt reports 100% subjective compliance. Past Medical Hx: I have personally reviewed medical hx  Past Medical History:   Diagnosis Date    Arthritis     Asthma     Carpal tunnel syndrome     Chronic lung disease     Chronic obstructive pulmonary disease (HCC)     Congestive heart failure (CHF) (HCC)     COPD (chronic obstructive pulmonary disease) (HCC)     GERD (gastroesophageal reflux disease)     Hypercholesterolemia     Hypertension     Hypothyroid     Osteopenia     Sleep apnea     Tendonitis     Thyroid disease        Past Surgical Hx: I have personally reviewed surgical hx  Past Surgical History:   Procedure Laterality Date    HX CHOLECYSTECTOMY      HX GYN  1999    vaginal hysterectomy    HX GYN  1977    tubal ligation    HX GYN  1984, 1998    D & C    HX HYSTERECTOMY      HX LUMBAR FUSION      HX ORTHOPAEDIC  01/2019    right foot fracture repair    HX ORTHOPAEDIC      back surgery       Family Hx: I have personally reviewed the family hx. No family hx of hereditary lung disease with immediate family. Family History   Problem Relation Age of Onset    Emphysema Mother     Cancer Father     Cancer Brother     Cancer Maternal Aunt     Cancer Maternal Uncle     Cancer Paternal Aunt     Cancer Paternal Uncle        Social Hx: I have personally reviewed the social hx.   Social History     Socioeconomic History    Marital status:      Spouse name: Not on file    Number of children: Not on file    Years of education: Not on file    Highest education level: Not on file   Occupational History    Not on file   Social Needs    Financial resource strain: Not on file    Food insecurity:     Worry: Not on file     Inability: Not on file    Transportation needs:     Medical: Not on file     Non-medical: Not on file   Tobacco Use    Smoking status: Current Every Day Smoker     Packs/day: 1.50     Years: 47.00     Pack years: 70.50    Smokeless tobacco: Never Used   Substance and Sexual Activity    Alcohol use: Yes     Comment: rarely    Drug use: No    Sexual activity: Not on file   Lifestyle    Physical activity:     Days per week: Not on file     Minutes per session: Not on file    Stress: Not on file   Relationships    Social connections:     Talks on phone: Not on file     Gets together: Not on file     Attends Oriental orthodox service: Not on file     Active member of club or organization: Not on file     Attends meetings of clubs or organizations: Not on file     Relationship status: Not on file    Intimate partner violence:     Fear of current or ex partner: Not on file     Emotionally abused: Not on file     Physically abused: Not on file     Forced sexual activity: Not on file   Other Topics Concern    Not on file   Social History Narrative    ** Merged History Encounter **            Allergies: I have reviewed the allergy hx  Allergies   Allergen Reactions    Codeine Other (comments)     Muscle spasms through chest    Codeine Other (comments)     Muscle spasms in chest       Medications:  I have reviewed the patient's medications  Prior to Admission medications    Medication Sig Start Date End Date Taking? Authorizing Provider   baclofen (LIORESAL) 10 mg tablet Take 1 Tab by mouth three (3) times daily. 4/8/19  Yes Aminta Mortensen PA-C   metFORMIN (GLUCOPHAGE) 500 mg tablet Take one tab po daily x 2 weeks, then increase to twice daily. 4/8/19  Yes Aminta Mortensen PA-C   atorvastatin (LIPITOR) 20 mg tablet Take 1 Tab by mouth daily.  3/19/19  Yes Pam Mortensen PA-C   montelukast (SINGULAIR) 10 mg tablet Take 1 Tab by mouth daily. 3/19/19  Yes Twila Mortensen PA-C   furosemide (LASIX) 20 mg tablet Take 1 Tab by mouth daily. 3/19/19  Yes Pam Mortensen PA-C   venlafaxine-SR (EFFEXOR XR) 75 mg capsule Take 1 Cap by mouth daily. 3/19/19  Yes Db Mortensen PA-C   lisinopril-hydroCHLOROthiazide (PRINZIDE, ZESTORETIC) 20-12.5 mg per tablet Take 1 Tab by mouth daily. 3/19/19  Yes Twila Mortensen PA-C   amLODIPine (NORVASC) 10 mg tablet Take 1 Tab by mouth daily. 3/19/19  Yes Twila Mortensen PA-C   tiotropium (SPIRIVA WITH HANDIHALER) 18 mcg inhalation capsule Take 1 Cap by inhalation daily. 3/19/19  Yes Pam Mortensen PA-C   potassium 99 mg tablet Take 1 Tab by mouth daily. 3/19/19  Yes Db Mortensen PA-C   traZODone (DESYREL) 100 mg tablet Take 200 mg by mouth nightly. Yes Provider, Historical   bipap machine kit by Does Not Apply route. Yes Provider, Historical   albuterol (PROAIR HFA) 90 mcg/actuation inhaler Take 2 Puffs by inhalation as needed for Wheezing. 3/14/19  Yes Db Mortensen PA-C   lansoprazole (PREVACID) 15 mg capsule One cap po daily. 3/14/19  Yes Db Mortensen PA-C   celecoxib (CELEBREX) 200 mg capsule Take 200 mg by mouth two (2) times a day. Yes Griselda, MD Baljinder   diphenhydrAMINE (BENADRYL) 25 mg capsule Take 25 mg by mouth every six (6) hours as needed. Yes Other, MD Baljinder   lisinopril-hydroCHLOROthiazide (PRINZIDE, ZESTORETIC) 10-12.5 mg per tablet Take 1 Tab by mouth daily. Yes Griselda, MD Baljinder   traZODone (DESYREL) 100 mg tablet Take 200 mg by mouth nightly. Yes Griselda, MD Baljinder   albuterol (PROAIR HFA) 90 mcg/actuation inhaler Take 2 Puffs by inhalation every six (6) hours as needed for Wheezing. Yes Other, MD Baljinder   melatonin 5 mg cap capsule Take 2 capsules by mouth at bedtime.  3/20/19   Db Mortensen PA-C   oxyCODONE-acetaminophen (PERCOCET) 5-325 mg per tablet Take  by mouth every six (6) hours as needed for Pain.    Provider, Historical   aspirin (ASPIRIN) 325 mg tablet Take 325 mg by mouth daily. Provider, Historical   Oxygen     Provider, Historical   Arm Brace (NEOPRENE WRIST SPLINT SUPPORT) misc To be worn daily. 3/14/19   Db Mortensen PA-C   oxyCODONE-acetaminophen (PERCOCET) 5-325 mg per tablet Take 1 Tab by mouth every six (6) hours as needed for Pain. Max Daily Amount: 4 Tabs. 1/19/19   EMILY Garcia       Immunizations:  I have reviewed the patient's immunizations  Immunization History   Administered Date(s) Administered    Influenza Vaccine (Quad) PF 12/10/2016       Review of Systems:  A complete review of systems was performed as stated in the HPI, all others are negative. Objective:    Physical Exam:  /64 (BP 1 Location: Left arm, BP Patient Position: At rest)   Pulse 77   Temp 99 °F (37.2 °C) (Oral)   Resp 21   Ht 5' 3\" (1.6 m)   Wt 123.8 kg (273 lb)   SpO2 96%   BMI 48.36 kg/m²   Vitals were personally reviewed  Gen: no acute distress, pleasant and cooperative, sitting up in chair, able to climb to exam table w/o difficulty, smells of cigarettes  HEENT: normocephalic/atraumatic, PERRLA, EOMI, no scleral icterus, nasal turbinates have no erythema, no nasal polyps, no oral lesions, poor dentition, Mallampati IV  Neck: supple, trachea midline, no JVD, no cervical and supraclavicular adenopathy  Chest: no lesions, with even rise and fall, no pectus excavatum or flail chest  CVS: regular rate rhythm, S1/S2, no murmurs/rubs/gallops  Lungs: Distant air entry B/L, CTABL, no wheezes/rales/rhonchi  Back: no kyphosis or scoliosis  Abdomen: soft, obese, nontender, bowel sounds present, no hepatosplenomegaly  Ext: no pitting edema B/L, no peripheral cyanosis or clubbing  Neuro: grossly normal, AAOx3, normal strength and coordination grossly, no focal deficits  Skin: no rashes, erythema, lesions  Psych: normal memory, thought content, and processing    Labs:   I have reviewed the patient's available labs --  Lab Results   Component Value Date/Time    WBC 9.8 03/14/2019 12:45 PM    HGB 15.0 03/14/2019 12:45 PM    HCT 49.8 (H) 03/14/2019 12:45 PM    PLATELET 654 57/76/5199 12:45 PM    MCV 95.2 03/14/2019 12:45 PM     Lab Results   Component Value Date/Time    Sodium 139 03/14/2019 12:45 PM    Potassium 4.5 03/14/2019 12:45 PM    Chloride 100 03/14/2019 12:45 PM    CO2 32 03/14/2019 12:45 PM    Anion gap 7 03/14/2019 12:45 PM    Glucose 101 (H) 03/14/2019 12:45 PM    BUN 10 03/14/2019 12:45 PM    Creatinine 0.70 03/14/2019 12:45 PM    BUN/Creatinine ratio 14 03/14/2019 12:45 PM    GFR est AA >60 03/14/2019 12:45 PM    GFR est non-AA >60 03/14/2019 12:45 PM    Calcium 9.0 03/14/2019 12:45 PM    Bilirubin, total 0.3 03/14/2019 12:45 PM    AST (SGOT) 11 (L) 03/14/2019 12:45 PM    Alk. phosphatase 120 (H) 03/14/2019 12:45 PM    Protein, total 7.4 03/14/2019 12:45 PM    Albumin 4.0 03/14/2019 12:45 PM    Globulin 3.4 03/14/2019 12:45 PM    A-G Ratio 1.2 03/14/2019 12:45 PM    ALT (SGPT) 21 03/14/2019 12:45 PM     Records reviewed in clinic as follows, PCP note from 4/21/2019 from [address] [place] [address] reports patient had issues with snoring and a deviated septum and sleep apnea-like symptoms, given ENT referral.  Patient also had lower back pain. Patient was also kept on metformin with hemoglobin A1c of 6.5. Imaging:  I have personally reviewed patient's imaging --no imaging on file    PFTs:  I have reviewed the patient's PFTs from today, spirometry is normal without bronchodilator response.     TTE:  I have reviewed the patient's TTE results  Results for orders placed or performed during the hospital encounter of 12/08/16   2D ECHO COMPLETE ADULT (TTE) W OR WO CONTR     Status: None    Narrative                                                                 Study ID: [id number] [facility] [address] [place] [address]                             Adult Echocardiogram Report    Name: Kristine Speaker Date:       2016 08:04 AM  MRN: [id number]                 Patient Location: [facility]^[id number]  : 1955             Age: 57 yrs  Height: 63 in               Weight: 323 lb                        BSA: 2.4 m2  BP: 184/82 mmHg  Gender: Female              Account #: [de-identified]  Reason For Study: Shortness of Breath  History:    Ordering Physician: Michael Corrigan  Performed By: Kaitlynn Nelson, [facility]    Interpretation Summary  A complete two-dimensional transthoracic echocardiogram was performed (2D, M-  mode, Doppler and color flow Doppler). The study was technically difficult. There are no hemodynamically significant valvular flow abnormalities. The left ventricle is normal in size with normal systolic function and normal  wall thickness. The calculated left ventricular ejection fraction is 60%. There is mild concentric left ventricular hypertrophy. Normal right ventricular size and systolic function. Trivial pericardial effusion which is hemodynamically insignificant. There is no previous echo for comparison,  All other findings are noted below. _____________________________________________________________________________  _      Left Ventricle  The left ventricle is grossly normal in size. There is mild concentric left  ventricular hypertrophy. Left ventricular systolic function is normal. The  calculated left ventricular ejection fraction is 60%. The left ventricular  wall motion is normal.      Right Ventricle  The right ventricle is normal in size and function. Atria  The left atrial size is normal. Right atrial size is normal. The interatrial  septum is intact with no evidence for an atrial septal defect. Mitral Valve  The mitral valve is normal in structure and function.  There is no evidence of  mitral valve prolapse. There is no mitral valve stenosis. There is no mitral  regurgitation noted. Tricuspid Valve  The tricuspid valve is normal in structure and function. There is no  tricuspid valve prolapse. There is no tricuspid stenosis. Right ventricular  systolic pressure is normal.  Aortic Valve  The aortic valve is normal in structure and function. No aortic stenosis. No  aortic regurgitation is present. Pulmonic Valve  The pulmonic valve is not well seen, but is grossly normal. There is no  pulmonic valvular stenosis. There is no pulmonic valvular regurgitation. Great Vessels  The aortic root is normal size. Pericardium/Pleural  Trivial pericardial effusion which is hemodynamically insignificant. MEASUREMENTS/CALCULATIONS:    MMode/2D Measurements & Calculations  IVSd: 1.2 cm                         LVIDd: 5.0 cm                                       LVIDs: 2.9 cm                                       LVPWd: 1.2 cm         _______________________________________________________________    FS: 41.6 %  Doppler Measurements & Calculations  MV E max levi: 108.0 cm/sec                MV dec time: 0.23 sec  MV A max levi: 72.8 cm/sec  MV E/A: 1.5      Electronically signed byDr. Tasha Carroll. Harini Yang MD   12/09/2016 10:53 AM           Assessment and Plan:  58 y.o. female with:    Impression:  1. Preoperative pulmonary risk assessment: Patient has upcoming ventral hernia repair at [facility] on 5/30/2019.  2.  COPD/asthma overlap syndrome: Based on symptoms, patient has gold risk category B COPD  3. Dyspnea/shortness of breath: Patient has severe dyspnea (mMRC of 3 to 4) which is multifactorial, due to COPD/asthma, morbid obesity, REAL/OHV, and suspected cardiomyopathy  4. Abnormal chest x-ray: Preoperative chest x-ray performed today in clinic shows hazy infiltrate in right middle lobe. Etiology unclear, patient does not endorse any infectious symptoms.   5.  REAL: Patient on CPAP, follows with Dr.H. Ashlee Bonds patient reports full subjective compliance  6. Morbid obesity: Body mass index is 48.36 kg/m². 7.  Tobacco dependence: Patient is a 1.75 pack/day smoker, at max 3 pack/day. Patient likely has over 100-pack-year smoking history  8. Hypoxia: Etiology due to above. In clinic on room air, SPO2 is 90%    Plan:  -For preoperative pulmonary risk assessment, pt is a high risk for pulmonary complications in the perioperative and postoperative periods. This is based on ARISCAT (Canet) risk index, as well as independent risk factor of REAL. Potential pulmonary complications include postoperative hypoxia, atelectasis, infection, exacerbation of COPD, and respiratory failure (both prolonged mechanical ventilation and unplanned reintubation). I have informed the patient and her daughter of the risks. All of their questions were answered. In order to optimize the patient's outcomes, I would recommend that:   Pt receive scheduled bronchodilators in the perioperative and postoperative period   Pt receive noninvasive positive pressure ventilation (CPAP or BiPAP) in the immediate postoperative period   Avoid use of neuromuscular blockers if possible   Avoid overuse of opioid and sedating medications in the postoperative period   Aggressive pulmonary toileting   Frequent incentive spirometry   Out of bed as soon as possible with early ambulation and involvement of physical therapy   DVT prophylaxis as soon as possible per the surgeon   I have also advised the patient to quit smoking at least 2 weeks prior to surgery in order to decrease risk of bronchospasm while under general anesthesia    -Chest x-ray PA and lateral performed in clinic, shows right middle lobe infiltrate, unclear etiology. Patient is high risk given smoking history. CT chest with and without contrast ordered. -6-minute walk test performed in clinic, patient ambulated [address] [facility] m.   No significant desaturations were seen, SPO2 went from 92% on room air down to a level of 89% on room air with ambulation.  -Continue Spiriva HandiHaler 1 puff once daily. Counseled patient to use every day  -Start Symbicort 160/4.5 MCG 2 puffs twice daily with spacer. Samples given in clinic. Counseled patient to rinse mouth thoroughly after each use and wash and dry spacer thoroughly after each use. -Continue singulair  -Continue albuterol HFA 1-2puffs q4-6h PRN. Counseled patient that this is their rescue inhaler. Also counseled patient regarding premedication 15-30m prior to exercise or exposure to very cold air  -Counseled patient on proper inhaler technique  -Counseled patient to avoid potential triggers of asthma. Advised to wear a mask when in a mally environment and while mowing the lawn or in high pollen area. Advised regarding home remediation including to wash/steam clean rugs/carpet, drapes, bedding on a frequent basis and consider allergy covers for pillows and bedding.  -Immunizations reviewed, influenza vaccination up-to-date. Patient will need Prevnar 13 in July 2020  -Counseled patient to follow back up with cardiology-given her ongoing dyspnea, history of CHF exacerbation, and other comorbidities. Patient expressed understanding  -I counseled the patient regarding annual lung cancer screening with annual low-dose CT scan once patient does not require regular CT chest.  Shared decision-making was performed.  -I spent 13 minutes with patient regarding cessation of smoking cigarettes. I reviewed health risks of tobacco use including increased risk of MI, stroke, cancer, etc.  We reviewed various approaches to cessation including pills, patches, inhaler, gum, weaning self, \"cold turkey\", and smoking cessation classes. Pt declined cessation assistance at this time, reports that she is unable to use Chantix or Wellbutrin due to being on an antidepressant. Follow-up and Dispositions    · Return in about 6 weeks (around 7/5/2019). Orders Placed This Encounter    AMB POC SPIROMETRY W/BRONCHODILATOR    PRO PULMONARY STRESS TESTING    XR CHEST PA LAT    CT CHEST W WO CONT    budesonide-formoterol (SYMBICORT) 160-4.5 mcg/actuation HFAA    budesonide-formoterol (SYMBICORT) 160-4.5 mcg/actuation HFAA    albuterol (PROVENTIL HFA, VENTOLIN HFA, PROAIR HFA) 90 mcg/actuation inhaler       Benja Gordillo MD/MPH     Pulmonary, Critical Care Medicine  [facility]

## 2019-05-24 NOTE — PROGRESS NOTES
Six Minute Walk Test (6MWT) recording form  Anjel Vail [address]    Marcus Hawkins       [id number]                                     1955 female  [id number]    [x]  Medical history checked  [x]  Medical clearance provided for the patient to participate in exercise testing      Contraindications to 6MWT:  [] Resting heart rate > 120 beats / min after 10 minutes rest (relative contraindication)  [] Systolic blood pressure > 180 mm Hg +/- diastolic blood pressure > 100 mm Hg (relative contraindication)  [] Resting SpO2 < 85% on room air or on prescribed level of supplemental oxygen  [] Physical disability preventing safe performance  [x] No contraindications identified             6MWT        2019  12:33 PM       Room Air  Mobility Aid       Time Mins BP SP02 HR RPE Distance Walked Rests/Comments   Rest 130/64 92% 85  34 M   SOB scale 2/10   1  91% 99  65 M                      5/10   2  89% 104  65 M                      7/10   3  89% 112  65 M                       8/10   4  89% 120  68 M                       9/10   5  91% 120  68 M                       10/10   6  90% 123  70 M                       10/10   Recovery 1  94% 106                           5/10   Recovery 2 140/68 96% 89                         3/10       Total distance:      466 M                                     Symptom recovery:            QUICK                           HR recovery:    QUICK                                         Limiting factor:     SORENESS IN HIPS WITH LONG AMBULATION                                       Was test terminated: [x] No   [] Yes  If yes, when? 6MWT Termination Criteria:  [] Chest pain or angina-like symptons  [] Heart rate > Predicted HR max.   [] Evolving mental confusioin, light-headedness or incoordination  [] Physical or verbal severe fatigue [] Intolerable dyspnea, unrelieved by rest  [] Persistent SP02 < 85% (Note pending clinical presentation)  [] Abnormal gait pattern (leg cramps, staggering, ataxia)  [] Other clinically warranted reason     INTERPRETATION:  The pt. became quickly short of breath but, maintained an O2 Sat of 89%. Heart rate noted to climb. She c/o bilat. Hip soreness with long ambulation.     Comparision 6 min walk distance:      RECOMMENDATION:      Km Bella LPN

## 2019-05-24 NOTE — PROGRESS NOTES
The pt. Is a smoke. 1 PPD    She is here for Med. Clearance for hernia surgery; tentative 5/30. No recent CXR    She no longer has O2 secondary to no Insurance. [facility] a concentrator which didn't work properly and an O2 Co. Wouldn't service. She has a CPap via Dr. Tato Sun which he uses nightly. Brigette Au presents today for   Chief Complaint   Patient presents with    COPD     Med. Clearance EMILY Boggs Umbilical hernia repair 6/77 Nonda Fake today, Echo 4/10    Asthma       Is someone accompanying this pt? Yes, daughter, Riana Carter    Is the patient using any DME equipment during OV? CPap   -DME Company None    Depression Screening:      Learning Assessment:  No flowsheet data found. Abuse Screening:  Smoked pot as a teenager    Fall Risk  Not since Jan. 2019      Coordination of Care:  1. Have you been to the ER, urgent care clinic since your last visit? Hospitalized since your last visit[facility]    2. Have you seen or consulted any other health care providers outside of the [address] [place] [address] since your last visit? Several MDs in the [place]. Medication variance in dosage/sig per patient as follows: Per Med. Rec.

## 2019-05-24 NOTE — PROGRESS NOTES
Verbal Order with read back per Filemon Brush MD  For PFT smart panel. AMB POC PFT complete w/ bronchodilator  Dr. Vanessa Royal MD will co-sign the orders.

## 2019-05-24 NOTE — LETTER
5/26/19 Patient: Jayson Love YOB: 1955 Date of Visit: 5/24/2019 Behzad Valle PA-C 
[address] [address] [address] VIA In Basket Heidi Espinoza MD 
[address] [facility] [address] [address] VIA Facsimile: [phone] Dear JUSTICE Paulson MD, Thank you for referring Ms. Jayson Love to [facility] [place] for evaluation. My notes for this consultation are attached. If you have questions, please do not hesitate to call me. I look forward to following your patient along with you. Sincerely, Shelia Da Silva MD

## 2019-06-04 ENCOUNTER — OFFICE VISIT (OUTPATIENT)
Dept: FAMILY MEDICINE CLINIC | Age: 64
End: 2019-06-04

## 2019-06-05 ENCOUNTER — OFFICE VISIT (OUTPATIENT)
Dept: FAMILY MEDICINE CLINIC | Age: 64
End: 2019-06-05

## 2019-06-05 VITALS
HEIGHT: 63 IN | WEIGHT: 270 LBS | HEART RATE: 90 BPM | OXYGEN SATURATION: 94 % | TEMPERATURE: 99.2 F | DIASTOLIC BLOOD PRESSURE: 80 MMHG | RESPIRATION RATE: 18 BRPM | SYSTOLIC BLOOD PRESSURE: 126 MMHG | BODY MASS INDEX: 47.84 KG/M2

## 2019-06-05 DIAGNOSIS — J44.9 CHRONIC OBSTRUCTIVE PULMONARY DISEASE, UNSPECIFIED COPD TYPE (HCC): ICD-10-CM

## 2019-06-05 DIAGNOSIS — I10 ESSENTIAL HYPERTENSION: ICD-10-CM

## 2019-06-05 DIAGNOSIS — Z71.6 ENCOUNTER FOR SMOKING CESSATION COUNSELING: Primary | ICD-10-CM

## 2019-06-05 DIAGNOSIS — G47.30 SLEEP APNEA, UNSPECIFIED TYPE: ICD-10-CM

## 2019-06-05 RX ORDER — BUPROPION HYDROCHLORIDE 150 MG/1
150 TABLET, EXTENDED RELEASE ORAL 2 TIMES DAILY
Qty: 180 TAB | Refills: 0 | Status: SHIPPED | OUTPATIENT
Start: 2019-06-05 | End: 2021-03-24

## 2019-06-05 RX ORDER — CEFUROXIME AXETIL 500 MG/1
TABLET ORAL 2 TIMES DAILY
COMMUNITY
End: 2020-01-08 | Stop reason: ALTCHOICE

## 2019-06-05 NOTE — PROGRESS NOTES
Chief Complaint   Patient presents with    Follow-up     from first visit. Patient states that she was sent to several different doctors. 1. Have you been to the ER, urgent care clinic since your last visit? Hospitalized since your last visit? No    2. Have you seen or consulted any other health care providers outside of the [address] [place] [address] since your last visit? Include any pap smears or colon screening.  Dr Rachelle Delaney, Dr Klever Matias,

## 2019-06-07 DIAGNOSIS — M62.830 MUSCLE SPASM OF BACK: ICD-10-CM

## 2019-06-07 NOTE — TELEPHONE ENCOUNTER
Requested Prescriptions     Pending Prescriptions Disp Refills    baclofen (LIORESAL) 10 mg tablet 90 Tab 1     Sig: Take 1 Tab by mouth three (3) times daily.

## 2019-06-09 RX ORDER — BACLOFEN 10 MG/1
10 TABLET ORAL 3 TIMES DAILY
Qty: 90 TAB | Refills: 1 | Status: SHIPPED | OUTPATIENT
Start: 2019-06-09 | End: 2019-09-06 | Stop reason: SDUPTHER

## 2019-06-11 NOTE — PROGRESS NOTES
Follow Up Visit Note    Chief Complaint   Patient presents with    Follow-up     from first visit. Patient states that she was sent to several different doctors. HPI:  Laurel Sesay is a 58 y.o.  female  has a past medical history of Arthritis, Asthma, Carpal tunnel syndrome, Chronic lung disease, Chronic obstructive pulmonary disease ([address] Congestive heart failure (CHF) ([address] COPD (chronic obstructive pulmonary disease) ([address] GERD (gastroesophageal reflux disease), Hypercholesterolemia, Hypertension, Hypothyroid, Osteopenia, Sleep apnea, Tendonitis, and Thyroid disease. is here for the above complaint(s). ENT  Patient has deviated septum. Patient was started on antibiotics. Was also started on a new antihistamine and a nasal spray, but insurance wouldn't cover the nasal spray. She is going to call ENT to have them send in new prescription. Sleep study  Patient did this a few weeks ago, she has a follow-up apt with sleep specialist next week. COPD   Patient saw pulmonology. See below for recommendations:    Impression:  1. Preoperative pulmonary risk assessment: Patient has upcoming ventral hernia repair at [facility] on 5/30/2019.  2.  COPD/asthma overlap syndrome: Based on symptoms, patient has gold risk category B COPD  3. Dyspnea/shortness of breath: Patient has severe dyspnea (mMRC of 3 to 4) which is multifactorial, due to COPD/asthma, morbid obesity, REAL/OHV, and suspected cardiomyopathy  4. Abnormal chest x-ray: Preoperative chest x-ray performed today in clinic shows hazy infiltrate in right middle lobe. Etiology unclear, patient does not endorse any infectious symptoms. 5.  REAL: Patient on CPAP, follows with Dr.H. Bassett Better patient reports full subjective compliance  6. Morbid obesity: Body mass index is 48.36 kg/m². 7.  Tobacco dependence: Patient is a 1.75 pack/day smoker, at max 3 pack/day. Patient likely has over 100-pack-year smoking history  8.   Hypoxia: Etiology due to above. In clinic on room air, SPO2 is 90%    Plan:  -Chest x-ray PA and lateral performed in clinic, shows right middle lobe infiltrate, unclear etiology. Patient is high risk given smoking history. CT chest with and without contrast ordered. -6-minute walk test performed in clinic, patient ambulated [facility]. No significant desaturations were seen, SPO2 went from 92% on room air down to a level of 89% on room air with ambulation.  -Continue Spiriva HandiHaler 1 puff once daily. Counseled patient to use every day  -Start Symbicort 160/4.5 MCG 2 puffs twice daily with spacer. Samples given in clinic. Counseled patient to rinse mouth thoroughly after each use and wash and dry spacer thoroughly after each use. -Continue singulair  -Continue albuterol HFA 1-2puffs q4-6h PRN. Counseled patient that this is their rescue inhaler. Also counseled patient regarding premedication 15-30m prior to exercise or exposure to very cold air  -Counseled patient on proper inhaler technique  -Counseled patient to avoid potential triggers of asthma. Advised to wear a mask when in a mally environment and while mowing the lawn or in high pollen area. Advised regarding home remediation including to wash/steam clean rugs/carpet, drapes, bedding on a frequent basis and consider allergy covers for pillows and bedding.  -Immunizations reviewed, influenza vaccination up-to-date. Patient will need Prevnar 13 in July 2020  -Counseled patient to follow back up with cardiology-given her ongoing dyspnea, history of CHF exacerbation, and other comorbidities. Patient expressed understanding  -I counseled the patient regarding annual lung cancer screening with annual low-dose CT scan once patient does not require regular CT chest.  Shared decision-making was performed.  -I spent 13 minutes with patient regarding cessation of smoking cigarettes.   I reviewed health risks of tobacco use including increased risk of MI, stroke, cancer, etc.  We reviewed various approaches to cessation including pills, patches, inhaler, gum, weaning self, \"cold turkey\", and smoking cessation classes. Pt declined cessation assistance at this time, reports that she is unable to use Chantix or Wellbutrin due to being on an antidepressant.     She has follow-up with pulmonary medicine in the next week. Hernia  Patient canceled surgery for now because she feels she is too high risk after seeing pulmonary medicine. She will follow-up with them once her health improves. At this point, her hernia is elective and not incarcerated. Will continue to monitor. Review of Systems   Constitutional: Negative for chills, fever, malaise/fatigue and weight loss. Eyes: Negative for blurred vision, double vision and pain. Respiratory: Positive for cough and shortness of breath. Negative for sputum production and wheezing. Cardiovascular: Negative for chest pain, palpitations, orthopnea, claudication and leg swelling. Gastrointestinal: Negative for abdominal pain, constipation, diarrhea, nausea and vomiting. Genitourinary: Negative for dysuria, frequency and urgency. Neurological: Negative for dizziness, tingling and headaches. Current Outpatient Medications   Medication Sig    cefUROXime (CEFTIN) 500 mg tablet Take  by mouth two (2) times a day.  budesonide-formoterol (SYMBICORT) 160-4.5 mcg/actuation HFAA Take 2 Puffs by inhalation two (2) times a day.  albuterol (PROVENTIL HFA, VENTOLIN HFA, PROAIR HFA) 90 mcg/actuation inhaler Take 1-2 Puffs by inhalation every four (4) hours as needed for Wheezing or Shortness of Breath.  baclofen (LIORESAL) 10 mg tablet Take 1 Tab by mouth three (3) times daily.  metFORMIN (GLUCOPHAGE) 500 mg tablet Take one tab po daily x 2 weeks, then increase to twice daily.  melatonin 5 mg cap capsule Take 2 capsules by mouth at bedtime.  atorvastatin (LIPITOR) 20 mg tablet Take 1 Tab by mouth daily.     montelukast (SINGULAIR) 10 mg tablet Take 1 Tab by mouth daily.  furosemide (LASIX) 20 mg tablet Take 1 Tab by mouth daily.  venlafaxine-SR (EFFEXOR XR) 75 mg capsule Take 1 Cap by mouth daily.  lisinopril-hydroCHLOROthiazide (PRINZIDE, ZESTORETIC) 20-12.5 mg per tablet Take 1 Tab by mouth daily.  amLODIPine (NORVASC) 10 mg tablet Take 1 Tab by mouth daily.  tiotropium (SPIRIVA WITH HANDIHALER) 18 mcg inhalation capsule Take 1 Cap by inhalation daily.  potassium 99 mg tablet Take 1 Tab by mouth daily.  aspirin (ASPIRIN) 325 mg tablet Take 325 mg by mouth daily.  Oxygen     lansoprazole (PREVACID) 15 mg capsule One cap po daily.  celecoxib (CELEBREX) 200 mg capsule Take 200 mg by mouth two (2) times a day.  diphenhydrAMINE (BENADRYL) 25 mg capsule Take 25 mg by mouth every six (6) hours as needed.  traZODone (DESYREL) 100 mg tablet Take 200 mg by mouth nightly.  budesonide-formoterol (SYMBICORT) 160-4.5 mcg/actuation HFAA Take 2 Puffs by inhalation two (2) times a day.  traZODone (DESYREL) 100 mg tablet Take 200 mg by mouth nightly.  oxyCODONE-acetaminophen (PERCOCET) 5-325 mg per tablet Take  by mouth every six (6) hours as needed for Pain.  bipap machine kit by Does Not Apply route.  Arm Brace (NEOPRENE WRIST SPLINT SUPPORT) misc To be worn daily.  oxyCODONE-acetaminophen (PERCOCET) 5-325 mg per tablet Take 1 Tab by mouth every six (6) hours as needed for Pain. Max Daily Amount: 4 Tabs.  lisinopril-hydroCHLOROthiazide (PRINZIDE, ZESTORETIC) 10-12.5 mg per tablet Take 1 Tab by mouth daily. No current facility-administered medications for this visit.       Health Maintenance   Topic Date Due    Hepatitis C Screening  1955    FOOT EXAM Q1  07/18/1964    EYE EXAM RETINAL OR DILATED  07/18/1964    Shingrix Vaccine Age 50> (1 of 2) 07/18/2004    FOBT Q 1 YEAR AGE 50-75  07/18/2004    LIPID PANEL Q1  07/13/2019    Influenza Age 9 to Adult  08/01/2019  HEMOGLOBIN A1C Q6M  09/14/2019    DTaP/Tdap/Td series (2 - Td) 12/07/2019    MICROALBUMIN Q1  03/14/2020    BREAST CANCER SCRN MAMMOGRAM  09/20/2020    Bone Densitometry (Dexa) Screening  Completed    Pneumococcal 0-64 years  Completed     Immunization History   Administered Date(s) Administered    Influenza Vaccine (Quad) PF 12/10/2016       Allergies and Medications: Reviewed and updated in EMR. Past Medical History:   Diagnosis Date    Arthritis     Asthma     Carpal tunnel syndrome     Chronic lung disease     Chronic obstructive pulmonary disease (HCC)     Congestive heart failure (CHF) (HCC)     COPD (chronic obstructive pulmonary disease) (HCC)     GERD (gastroesophageal reflux disease)     Hypercholesterolemia     Hypertension     Hypothyroid     Osteopenia     Sleep apnea     Tendonitis     Thyroid disease        Surgical History: Reviewed and updated in EMR as appropriate. Social History: Reviewed and updated in EMR as appropriate. Family History: Reviewed and updated in EMR as appropriate. OBJECTIVE:   Visit Vitals  /80   Pulse 90   Temp 99.2 °F (37.3 °C) (Oral)   Resp 18   Ht 5' 3\" (1.6 m)   Wt 270 lb (122.5 kg)   SpO2 94%   BMI 47.83 kg/m²        Physical Exam   Constitutional: She is oriented to person, place, and time and well-developed, well-nourished, and in no distress. No distress. Neck: Normal range of motion. Neck supple. No thyromegaly present. Cardiovascular: Normal rate, regular rhythm, normal heart sounds and intact distal pulses. Exam reveals no gallop and no friction rub. No murmur heard. Pulmonary/Chest: Effort normal. No respiratory distress. She has decreased breath sounds in the right upper field, the right lower field, the left upper field and the left lower field. She has no wheezes. She has no rales. She exhibits no tenderness. Lymphadenopathy:     She has no cervical adenopathy.    Neurological: She is alert and oriented to person, place, and time. Skin: Skin is warm and dry. She is not diaphoretic. Psychiatric: Mood, memory, affect and judgment normal.   Vitals reviewed. LABS/RADIOLOGICAL TESTS:  Lab Results   Component Value Date/Time    WBC 9.8 03/14/2019 12:45 PM    HGB 15.0 03/14/2019 12:45 PM    HCT 49.8 (H) 03/14/2019 12:45 PM    PLATELET 133 43/54/9367 12:45 PM     Lab Results   Component Value Date/Time    Sodium 139 03/14/2019 12:45 PM    Potassium 4.5 03/14/2019 12:45 PM    Chloride 100 03/14/2019 12:45 PM    CO2 32 03/14/2019 12:45 PM    Glucose 101 (H) 03/14/2019 12:45 PM    BUN 10 03/14/2019 12:45 PM    Creatinine 0.70 03/14/2019 12:45 PM     Lab Results   Component Value Date/Time    Cholesterol, total 167 07/13/2018 09:35 AM    HDL Cholesterol 46 07/13/2018 09:35 AM    LDL, calculated 94 07/13/2018 09:35 AM    Triglyceride 133 07/13/2018 09:35 AM     No results found for: GPT  Lab Results   Component Value Date/Time    Hemoglobin A1c 6.5 (H) 03/14/2019 12:45 PM         All lab results and radiological studies were reviewed and discussed with the patient. ASSESSMENT/PLAN:    Diagnoses and all orders for this visit:    1. Encounter for smoking cessation counseling  The patient was counseled on the dangers of tobacco use, and was advised to quit. Reviewed strategies to maximize success, including removing cigarettes and smoking materials from environment, written materials and pharmacotherapy (wellbutrin). 16 minutes spent with patient exclusively discussing smoking cessation.   -     buPROPion SR (WELLBUTRIN SR) 150 mg SR tablet; Take 1 Tab by mouth two (2) times a day. 2. Chronic obstructive pulmonary disease, unspecified COPD type ([address]  Continue with   Key COPD Medications             tiotropium (SPIRIVA WITH HANDIHALER) 18 mcg inhalation capsule (Taking) Take 1 Cap by inhalation daily. budesonide-formoterol (SYMBICORT) 160-4.5 mcg/actuation HFAA (Taking) Take 2 Puffs by inhalation two (2) times a day. albuterol (PROVENTIL HFA, VENTOLIN HFA, PROAIR HFA) 90 mcg/actuation inhaler (Taking) Take 1-2 Puffs by inhalation every four (4) hours as needed for Wheezing or Shortness of Breath.    montelukast (SINGULAIR) 10 mg tablet (Taking) Take 1 Tab by mouth daily. budesonide-formoterol (SYMBICORT) 160-4.5 mcg/actuation HFAA Take 2 Puffs by inhalation two (2) times a day. -     tiotropium (SPIRIVA WITH HANDIHALER) 18 mcg inhalation capsule; Take 1 Cap by inhalation daily. 3. Sleep apnea, unspecified type        -Follow-up with sleep specialist  4. Essential hypertension  Continue with  Key CAD CHF Meds             atorvastatin (LIPITOR) 20 mg tablet (Taking) Take 1 Tab by mouth daily. furosemide (LASIX) 20 mg tablet (Taking) Take 1 Tab by mouth daily. lisinopril-hydroCHLOROthiazide (PRINZIDE, ZESTORETIC) 20-12.5 mg per tablet (Taking) Take 1 Tab by mouth daily. amLODIPine (NORVASC) 10 mg tablet (Taking) Take 1 Tab by mouth daily. aspirin (ASPIRIN) 325 mg tablet (Taking) Take 325 mg by mouth daily. lisinopril-hydroCHLOROthiazide (PRINZIDE, ZESTORETIC) 10-12.5 mg per tablet Take 1 Tab by mouth daily. Requested Prescriptions      No prescriptions requested or ordered in this encounter     Patient verbalized understanding and agreement with the plan. Patient was given an after-visit summary. Follow-up in 1 month or sooner if worsening symptoms. More than 50% of this 25 min visit was spent counseling the patient face to face about etiology and treatment of health conditions outlined in assessment and plan        Pam Mortensen PA-C  [address], [address] [place] [address], [address] [place] [address] [address] [place] [address] [phone]

## 2019-06-17 ENCOUNTER — TELEPHONE (OUTPATIENT)
Dept: FAMILY MEDICINE CLINIC | Age: 64
End: 2019-06-17

## 2019-06-17 NOTE — TELEPHONE ENCOUNTER
Pt states was referred to [facility]. Pt states went to first [facility] facility this morning. \[facility]\"" was told Insurance is longer accepted [facility]. Pt states please find another facility or just send a fit kit in the mail.

## 2019-06-18 ENCOUNTER — TELEPHONE (OUTPATIENT)
Dept: FAMILY MEDICINE CLINIC | Age: 64
End: 2019-06-18

## 2019-06-18 NOTE — TELEPHONE ENCOUNTER
Verified with the insurance company the closest gastro office that the insurance was accepted. [facility]   MD Nalini Blevins [facility] [address], [address] [facility]  Phone: [phone]  Fax: [phone]     Faxed notes, confirmation received.

## 2019-06-18 NOTE — TELEPHONE ENCOUNTER
Per fax from [facility] [place] no longer accept [employer] includes the [facility]

## 2019-06-27 ENCOUNTER — TELEPHONE (OUTPATIENT)
Dept: FAMILY MEDICINE CLINIC | Age: 64
End: 2019-06-27

## 2019-06-27 ENCOUNTER — TELEPHONE (OUTPATIENT)
Dept: PULMONOLOGY | Age: 64
End: 2019-06-27

## 2019-06-27 NOTE — TELEPHONE ENCOUNTER
Pt requesting a referral to podiatry due to broken bone in foot is becoming painful and swollen everyday.  States please send referral to [facility] Dr Ndiaye [facility] office [phone]

## 2019-06-27 NOTE — TELEPHONE ENCOUNTER
Order faxed to [facility] Scheduling Dept. Called and notified patient.

## 2019-06-27 NOTE — TELEPHONE ENCOUNTER
Pt states an order for her CT scan was supposed to be faxed to [facility] to be completed prior to her 7/3 appt w/ Arthur Gabriel. Razia Palomo says they haven't received order. Pt wanting an update due to not wanting to resched 7/3 appt. Please advise [phone].

## 2019-06-28 NOTE — TELEPHONE ENCOUNTER
Please let patient know that she really needs to call her ortho physician that saw her for the fractured foot as I have not seen her for this issue. Their office can refer to podiatry prn. Thank you. Pam Mortensen PA-C  [facility]  [phone]  [address]

## 2019-07-02 DIAGNOSIS — F32.A DEPRESSION, UNSPECIFIED DEPRESSION TYPE: ICD-10-CM

## 2019-07-02 DIAGNOSIS — I50.30 DIASTOLIC CONGESTIVE HEART FAILURE, UNSPECIFIED HF CHRONICITY (HCC): ICD-10-CM

## 2019-07-02 DIAGNOSIS — J45.909 UNCOMPLICATED ASTHMA, UNSPECIFIED ASTHMA SEVERITY, UNSPECIFIED WHETHER PERSISTENT: ICD-10-CM

## 2019-07-02 NOTE — TELEPHONE ENCOUNTER
Requested Prescriptions     Pending Prescriptions Disp Refills    montelukast (SINGULAIR) 10 mg tablet 90 Tab 0     Sig: Take 1 Tab by mouth daily.  venlafaxine-SR (EFFEXOR XR) 75 mg capsule 90 Cap 0     Sig: Take 1 Cap by mouth daily.  furosemide (LASIX) 20 mg tablet 90 Tab 0     Sig: Take 1 Tab by mouth daily.  melatonin 5 mg cap capsule 180 Cap 0     Sig: Take 2 capsules by mouth at bedtime.

## 2019-07-05 RX ORDER — FUROSEMIDE 20 MG/1
20 TABLET ORAL DAILY
Qty: 90 TAB | Refills: 0 | Status: SHIPPED | OUTPATIENT
Start: 2019-07-05 | End: 2019-10-23 | Stop reason: SDUPTHER

## 2019-07-05 RX ORDER — MONTELUKAST SODIUM 10 MG/1
10 TABLET ORAL DAILY
Qty: 90 TAB | Refills: 0 | Status: SHIPPED | OUTPATIENT
Start: 2019-07-05 | End: 2019-10-23 | Stop reason: SDUPTHER

## 2019-07-05 RX ORDER — VENLAFAXINE HYDROCHLORIDE 75 MG/1
75 CAPSULE, EXTENDED RELEASE ORAL DAILY
Qty: 90 CAP | Refills: 0 | Status: SHIPPED | OUTPATIENT
Start: 2019-07-05 | End: 2019-10-10 | Stop reason: SDUPTHER

## 2019-07-05 RX ORDER — MELATONIN 5 MG
CAPSULE ORAL
Qty: 180 CAP | Refills: 0 | Status: SHIPPED | OUTPATIENT
Start: 2019-07-05 | End: 2021-07-21

## 2019-07-09 ENCOUNTER — TELEPHONE (OUTPATIENT)
Dept: FAMILY MEDICINE CLINIC | Age: 64
End: 2019-07-09

## 2019-07-09 NOTE — TELEPHONE ENCOUNTER
I called the patient and left a message for her to call the office. The patient needs to be told that her insurance is not accepted anywhere in [place] that I can find for Jeramy Brown. She will need to call her insurance company to find out where the closest facility is that takes her insurance.  She will need to call us back once she has a location so we can send her referral.

## 2019-07-09 NOTE — TELEPHONE ENCOUNTER
Pt returning call regarding referral to gastro. Advised pt of message left by Shekhar Sierra. Pt inquiring if she can just use a Fit KIT. States please call to discuss.

## 2019-07-10 NOTE — TELEPHONE ENCOUNTER
Magdalena Marie- we can send this patient to our colorectal surgeons for colonoscopy, right? If so, I can put in the referral. Thank you. Pam Mortensen PA-C  [facility]  [phone]  [address]

## 2019-07-11 NOTE — TELEPHONE ENCOUNTER
I checked with [facility] and per Steven Pabon they do accept pts insurance. Sending Referral Internally.      [facility]  Usha Robbins MD  [address] [place] [address]  Phone: [phone]  Fax: [phone]

## 2019-07-18 ENCOUNTER — OFFICE VISIT (OUTPATIENT)
Dept: FAMILY MEDICINE CLINIC | Age: 64
End: 2019-07-18

## 2019-07-18 ENCOUNTER — TELEPHONE (OUTPATIENT)
Dept: FAMILY MEDICINE CLINIC | Age: 64
End: 2019-07-18

## 2019-07-18 VITALS
BODY MASS INDEX: 47.48 KG/M2 | WEIGHT: 268 LBS | HEART RATE: 78 BPM | HEIGHT: 63 IN | OXYGEN SATURATION: 90 % | SYSTOLIC BLOOD PRESSURE: 129 MMHG | RESPIRATION RATE: 18 BRPM | TEMPERATURE: 99.6 F | DIASTOLIC BLOOD PRESSURE: 54 MMHG

## 2019-07-18 DIAGNOSIS — R91.8 ABNORMAL CT SCAN OF LUNG: ICD-10-CM

## 2019-07-18 DIAGNOSIS — J44.9 CHRONIC OBSTRUCTIVE PULMONARY DISEASE, UNSPECIFIED COPD TYPE (HCC): ICD-10-CM

## 2019-07-18 DIAGNOSIS — Z72.0 NICOTINE ABUSE: ICD-10-CM

## 2019-07-18 DIAGNOSIS — M79.671 FOOT PAIN, RIGHT: ICD-10-CM

## 2019-07-18 DIAGNOSIS — Z12.11 ENCOUNTER FOR SCREENING COLONOSCOPY: Primary | ICD-10-CM

## 2019-07-18 RX ORDER — LORATADINE 10 MG/1
10 TABLET ORAL DAILY
COMMUNITY

## 2019-07-18 RX ORDER — FLUTICASONE PROPIONATE 50 MCG
2 SPRAY, SUSPENSION (ML) NASAL DAILY
COMMUNITY

## 2019-07-18 NOTE — TELEPHONE ENCOUNTER
Call placed to [facility] to request a pre op form for patient to have multiple teeth extracted. States they are not sure how patient will be sedated. States they will fax form with sedation information.

## 2019-07-18 NOTE — PROGRESS NOTES
Follow Up Visit Note    Chief Complaint   Patient presents with    Pre-op Exam     clearance to have 9-10 teeth removed        HPI:  Siddharth Nash is a 71 y.o.  female  has a past medical history of Arthritis, Asthma, Carpal tunnel syndrome, Chronic lung disease, Chronic obstructive pulmonary disease ([address] Congestive heart failure (CHF) ([address] COPD (chronic obstructive pulmonary disease) ([address] GERD (gastroesophageal reflux disease), Hypercholesterolemia, Hypertension, Hypothyroid, Osteopenia, Sleep apnea, Tendonitis, and Thyroid disease. is here for the above complaint(s). Patient has been discussing with her dentist, Dr. Hong Garsia, plans to remove her lower teeth. She would like to have this done in the next few months. There is no emergency to remove her teeth, but they do need to be removed. Patient knows that she has multiple medical conditions that need to be addressed prior to this procedure. She is here to discuss. COPD: Patient recently saw pulmonary, had recent CT of the chest done, ordered by pulmonology. Patient is seeing Dr. Mervin Fernandes with [facility] Pulmonology. CT of chest shows:  IMPRESSION:  1. Numerous scattered bilateral pulmonary nodules and pleural associated  densities. A prominent 7 mm left upper lobe medial pleural associated density. Follow-up per Fleischner criteria. 2. Tree-in-bud nodularity within the right upper lobe with apparent mucoid  impaction of the small airway proximally. This is consistent with an infectious  or inflammatory process. 3. Thickening and nodularity of the left adrenal gland. This may reflect adrenal  hyperplasia. Right Foot Pain  Patient has ongoing pain in her right foot. She recently fractured her foot and now has continued pain. She states that her ortho surgeon has discharged her and told her that there is nothing further that he can do for her.  She has ongoing 5/10, worse with bearing weight, walking up stairs, better with rest. Patient's pain is aching/throbbing in nature. Patient has radiating pain down into her toes. She endorses some numbness/tingling as well. She denies any redness, warmth, ulcers, drainage, or bleeding. DM-2  Key Antihyperglycemic Medications             metFORMIN (GLUCOPHAGE) 500 mg tablet (Taking) Take one tab po daily x 2 weeks, then increase to twice daily. Lab Results   Component Value Date/Time    Hemoglobin A1c 6.5 (H) 03/14/2019 12:45 PM       medication compliance: compliant all of the time, diabetic diet compliance: noncompliant some of the time, home glucose monitoring: is performed sporadically    Hypertension  Patient is currently taking   Key CAD CHF Meds             furosemide (LASIX) 20 mg tablet (Taking) Take 1 Tab by mouth daily. atorvastatin (LIPITOR) 20 mg tablet (Taking) Take 1 Tab by mouth daily. lisinopril-hydroCHLOROthiazide (PRINZIDE, ZESTORETIC) 20-12.5 mg per tablet (Taking) Take 1 Tab by mouth daily. amLODIPine (NORVASC) 10 mg tablet (Taking) Take 1 Tab by mouth daily. aspirin (ASPIRIN) 325 mg tablet Take 325 mg by mouth daily. lisinopril-hydroCHLOROthiazide (PRINZIDE, ZESTORETIC) 10-12.5 mg per tablet Take 1 Tab by mouth daily. . BP today is   BP Readings from Last 1 Encounters:   07/18/19 129/54     Patient has been taking medications as prescribed. . Patient does not follow low salt diet. Patient is not currently exercising. Patient denies chest pain, shortness of breath, palpitations, lower extremity edema, dizziness/lightheadedness or syncopal episodes. Key CAD CHF Meds             furosemide (LASIX) 20 mg tablet (Taking) Take 1 Tab by mouth daily. atorvastatin (LIPITOR) 20 mg tablet (Taking) Take 1 Tab by mouth daily. lisinopril-hydroCHLOROthiazide (PRINZIDE, ZESTORETIC) 20-12.5 mg per tablet (Taking) Take 1 Tab by mouth daily. amLODIPine (NORVASC) 10 mg tablet (Taking) Take 1 Tab by mouth daily.     aspirin (ASPIRIN) 325 mg tablet Take 325 mg by mouth daily. lisinopril-hydroCHLOROthiazide (PRINZIDE, ZESTORETIC) 10-12.5 mg per tablet Take 1 Tab by mouth daily. Elevated Lipids    Key CAD CHF Meds             furosemide (LASIX) 20 mg tablet (Taking) Take 1 Tab by mouth daily. atorvastatin (LIPITOR) 20 mg tablet (Taking) Take 1 Tab by mouth daily. lisinopril-hydroCHLOROthiazide (PRINZIDE, ZESTORETIC) 20-12.5 mg per tablet (Taking) Take 1 Tab by mouth daily. amLODIPine (NORVASC) 10 mg tablet (Taking) Take 1 Tab by mouth daily. aspirin (ASPIRIN) 325 mg tablet Take 325 mg by mouth daily. lisinopril-hydroCHLOROthiazide (PRINZIDE, ZESTORETIC) 10-12.5 mg per tablet Take 1 Tab by mouth daily. Lab Results   Component Value Date/Time    Cholesterol, total 167 07/13/2018 09:35 AM    HDL Cholesterol 46 07/13/2018 09:35 AM    LDL, calculated 94 07/13/2018 09:35 AM    Triglyceride 133 07/13/2018 09:35 AM    CHOL/HDL Ratio 3.6 07/13/2018 09:35 AM         Obesity    Wt Readings from Last 5 Encounters:   07/18/19 268 lb (121.6 kg)   06/05/19 270 lb (122.5 kg)   05/24/19 273 lb (123.8 kg)   04/08/19 273 lb 9.6 oz (124.1 kg)   03/14/19 277 lb 9.6 oz (125.9 kg)         Review of Systems   Constitutional: Negative for chills, fever, malaise/fatigue and weight loss. Eyes: Negative for blurred vision, double vision and pain. Respiratory: Negative for cough, sputum production, shortness of breath and wheezing. Cardiovascular: Negative for chest pain, palpitations, orthopnea, claudication and leg swelling. Gastrointestinal: Negative for abdominal pain, constipation, diarrhea, nausea and vomiting. Genitourinary: Negative for dysuria, frequency and urgency. Musculoskeletal: Positive for joint pain (right foot pain). Negative for back pain, falls, myalgias and neck pain. Neurological: Negative for dizziness, tingling and headaches.          Current Outpatient Medications   Medication Sig    loratadine (CLARITIN) 10 mg tablet Take 10 mg by mouth.  fluticasone propionate (FLONASE ALLERGY RELIEF) 50 mcg/actuation nasal spray 2 Sprays by Both Nostrils route daily.  montelukast (SINGULAIR) 10 mg tablet Take 1 Tab by mouth daily.  venlafaxine-SR (EFFEXOR XR) 75 mg capsule Take 1 Cap by mouth daily.  furosemide (LASIX) 20 mg tablet Take 1 Tab by mouth daily.  melatonin 5 mg cap capsule Take 2 capsules by mouth at bedtime.  baclofen (LIORESAL) 10 mg tablet Take 1 Tab by mouth three (3) times daily.  buPROPion SR (WELLBUTRIN SR) 150 mg SR tablet Take 1 Tab by mouth two (2) times a day.  tiotropium (SPIRIVA WITH HANDIHALER) 18 mcg inhalation capsule Take 1 Cap by inhalation daily.  budesonide-formoterol (SYMBICORT) 160-4.5 mcg/actuation HFAA Take 2 Puffs by inhalation two (2) times a day.  albuterol (PROVENTIL HFA, VENTOLIN HFA, PROAIR HFA) 90 mcg/actuation inhaler Take 1-2 Puffs by inhalation every four (4) hours as needed for Wheezing or Shortness of Breath.  metFORMIN (GLUCOPHAGE) 500 mg tablet Take one tab po daily x 2 weeks, then increase to twice daily.  atorvastatin (LIPITOR) 20 mg tablet Take 1 Tab by mouth daily.  lisinopril-hydroCHLOROthiazide (PRINZIDE, ZESTORETIC) 20-12.5 mg per tablet Take 1 Tab by mouth daily.  amLODIPine (NORVASC) 10 mg tablet Take 1 Tab by mouth daily.  potassium 99 mg tablet Take 1 Tab by mouth daily.  traZODone (DESYREL) 100 mg tablet Take 200 mg by mouth nightly.  lansoprazole (PREVACID) 15 mg capsule One cap po daily.  celecoxib (CELEBREX) 200 mg capsule Take 200 mg by mouth two (2) times a day.  diphenhydrAMINE (BENADRYL) 25 mg capsule Take 25 mg by mouth every six (6) hours as needed.  cefUROXime (CEFTIN) 500 mg tablet Take  by mouth two (2) times a day.  budesonide-formoterol (SYMBICORT) 160-4.5 mcg/actuation HFAA Take 2 Puffs by inhalation two (2) times a day.     oxyCODONE-acetaminophen (PERCOCET) 5-325 mg per tablet Take  by mouth every six (6) hours as needed for Pain.  aspirin (ASPIRIN) 325 mg tablet Take 325 mg by mouth daily.  Oxygen     bipap machine kit by Does Not Apply route.  Arm Brace (NEOPRENE WRIST SPLINT SUPPORT) misc To be worn daily.  oxyCODONE-acetaminophen (PERCOCET) 5-325 mg per tablet Take 1 Tab by mouth every six (6) hours as needed for Pain. Max Daily Amount: 4 Tabs.  lisinopril-hydroCHLOROthiazide (PRINZIDE, ZESTORETIC) 10-12.5 mg per tablet Take 1 Tab by mouth daily.  traZODone (DESYREL) 100 mg tablet Take 200 mg by mouth nightly. No current facility-administered medications for this visit. Health Maintenance   Topic Date Due    Hepatitis C Screening  1955    FOOT EXAM Q1  07/18/1964    EYE EXAM RETINAL OR DILATED  07/18/1964    Shingrix Vaccine Age 50> (1 of 2) 07/18/2004    FOBT Q 1 YEAR AGE 50-75  07/18/2004    LIPID PANEL Q1  07/13/2019    GLAUCOMA SCREENING Q2Y  07/18/2019    Pneumococcal 65+ years (1 of 2 - PCV13) 07/18/2019    Influenza Age 9 to Adult  08/01/2019    HEMOGLOBIN A1C Q6M  09/14/2019    DTaP/Tdap/Td series (2 - Td) 12/07/2019    MICROALBUMIN Q1  03/14/2020    BREAST CANCER SCRN MAMMOGRAM  09/20/2020    Bone Densitometry (Dexa) Screening  Completed     Immunization History   Administered Date(s) Administered    Influenza Vaccine (Quad) PF 12/10/2016       Allergies and Medications: Reviewed and updated in EMR. Past Medical History:   Diagnosis Date    Arthritis     Asthma     Carpal tunnel syndrome     Chronic lung disease     Chronic obstructive pulmonary disease (HCC)     Congestive heart failure (CHF) (HCC)     COPD (chronic obstructive pulmonary disease) (HCC)     GERD (gastroesophageal reflux disease)     Hypercholesterolemia     Hypertension     Hypothyroid     Osteopenia     Sleep apnea     Tendonitis     Thyroid disease        Surgical History: Reviewed and updated in EMR as appropriate.   Social History: Reviewed and updated in EMR as appropriate. Family History: Reviewed and updated in EMR as appropriate. OBJECTIVE:   Visit Vitals  /54   Pulse 78   Temp 99.6 °F (37.6 °C) (Oral)   Resp 18   Ht 5' 3\" (1.6 m)   Wt 268 lb (121.6 kg)   SpO2 90%   BMI 47.47 kg/m²        Physical Exam   Constitutional: She is oriented to person, place, and time and well-developed, well-nourished, and in no distress. No distress. Neck: Normal range of motion. Neck supple. No thyromegaly present. Cardiovascular: Normal rate, regular rhythm, normal heart sounds and intact distal pulses. Exam reveals no gallop and no friction rub. No murmur heard. Pulmonary/Chest: Effort normal and breath sounds normal. No respiratory distress. She has no wheezes. She has no rales. She exhibits no tenderness. Musculoskeletal:        Right foot: There is decreased range of motion, tenderness and bony tenderness. There is no swelling, normal capillary refill, no crepitus, no deformity and no laceration. Lymphadenopathy:     She has no cervical adenopathy. Neurological: She is alert and oriented to person, place, and time. Skin: Skin is warm and dry. She is not diaphoretic. Nursing note and vitals reviewed.       LABS/RADIOLOGICAL TESTS:  Lab Results   Component Value Date/Time    WBC 9.8 03/14/2019 12:45 PM    HGB 15.0 03/14/2019 12:45 PM    HCT 49.8 (H) 03/14/2019 12:45 PM    PLATELET 074 94/67/4350 12:45 PM     Lab Results   Component Value Date/Time    Sodium 139 03/14/2019 12:45 PM    Potassium 4.5 03/14/2019 12:45 PM    Chloride 100 03/14/2019 12:45 PM    CO2 32 03/14/2019 12:45 PM    Glucose 101 (H) 03/14/2019 12:45 PM    BUN 10 03/14/2019 12:45 PM    Creatinine 0.7 07/10/2019 01:30 PM     Lab Results   Component Value Date/Time    Cholesterol, total 167 07/13/2018 09:35 AM    HDL Cholesterol 46 07/13/2018 09:35 AM    LDL, calculated 94 07/13/2018 09:35 AM    Triglyceride 133 07/13/2018 09:35 AM     No results found for: GPT  Lab Results   Component Value Date/Time    Hemoglobin A1c 6.5 (H) 03/14/2019 12:45 PM         All lab results and radiological studies were reviewed and discussed with the patient. ASSESSMENT/PLAN:    Diagnoses and all orders for this visit:    1. Encounter for screening colonoscopy  -     COLOGUARD TEST (FECAL DNA COLORECTAL CANCER SCREENING)    2. Foot pain, right  -     REFERRAL TO PODIATRY    3. Abnormal CT scan of lung  Called [facility] Pulmonary Specialist. Put in a message to have Dr. Maria Antonia Trotter call me in regards to patient's recent CT of the chest. The  staff stated that she will put in a message for Dr. Maria Antonia Trotter to call me back in regards to her abnormal chest CT. 4. Chronic obstructive pulmonary disease, unspecified COPD type ([facility]  Continue with  Key COPD Medications             montelukast (SINGULAIR) 10 mg tablet (Taking) Take 1 Tab by mouth daily. tiotropium (SPIRIVA WITH HANDIHALER) 18 mcg inhalation capsule (Taking) Take 1 Cap by inhalation daily. budesonide-formoterol (SYMBICORT) 160-4.5 mcg/actuation HFAA (Taking) Take 2 Puffs by inhalation two (2) times a day. albuterol (PROVENTIL HFA, VENTOLIN HFA, PROAIR HFA) 90 mcg/actuation inhaler (Taking) Take 1-2 Puffs by inhalation every four (4) hours as needed for Wheezing or Shortness of Breath. budesonide-formoterol (SYMBICORT) 160-4.5 mcg/actuation HFAA Take 2 Puffs by inhalation two (2) times a day. 5. Nicotine abuse  The patient was counseled on the dangers of tobacco use, and was advised to quit. Reviewed strategies to maximize success, including removing cigarettes and smoking materials from environment and stress management. Patient verbalized understanding and agreement with the plan. Patient was given an after-visit summary. Follow-up in 1 month or sooner if worsening symptoms.     More than 50% of this 25 min visit was spent counseling the patient face to face about etiology and treatment of health conditions outlined in assessment and plan        Pam Mortensen PA-C  [facility], [place]  [address] [facility], [address] [place] [address], [address] [place] [address] [phone]  [facility] [phone]

## 2019-07-18 NOTE — PROGRESS NOTES
Chief Complaint   Patient presents with    Pre-op Exam     clearance to have 9-10 teeth removed      1. Have you been to the ER, urgent care clinic since your last visit? Hospitalized since your last visit? No    2. Have you seen or consulted any other health care providers outside of the [address] [place] [address] since your last visit? Include any pap smears or colon screening.  Dentist

## 2019-07-19 ENCOUNTER — TELEPHONE (OUTPATIENT)
Dept: FAMILY MEDICINE CLINIC | Age: 64
End: 2019-07-19

## 2019-08-05 DIAGNOSIS — M19.90 ARTHRITIS: Primary | ICD-10-CM

## 2019-08-05 NOTE — TELEPHONE ENCOUNTER
Requested Prescriptions     Pending Prescriptions Disp Refills    celecoxib (CELEBREX) 200 mg capsule       Sig: Take 1 Cap by mouth two (2) times a day.

## 2019-08-07 NOTE — TELEPHONE ENCOUNTER
Please call and find out if patient is still taking aspirin. Also, this will be the last RF of this medication. She needs an apt to discuss future med refills. Pam Mortensen PA-C  [facility]  [phone]  [address]

## 2019-08-09 NOTE — TELEPHONE ENCOUNTER
See my note below. Pam Mortensen PA-C  [facility]  [phone]  [address]

## 2019-08-13 NOTE — TELEPHONE ENCOUNTER
Patient called back. States that she is not taking Aspirin anymore. Informed patient that she needs to schedule appt to discuss future refills. Patient verbalized understanding but states that her Medicaid was canceled but that she has applied for Medicare. Her appt with Medicare is Oct 2019. Patient is currently without insurance. Routed to provider for review.

## 2019-08-13 NOTE — TELEPHONE ENCOUNTER
Left message for patient to call back. Need to see if patient is taking Aspirin and to inform patient that she needs to schedule appt to discuss future refills of this medication.

## 2019-08-14 RX ORDER — CELECOXIB 200 MG/1
200 CAPSULE ORAL 2 TIMES DAILY
Qty: 60 CAP | Refills: 0 | Status: SHIPPED | OUTPATIENT
Start: 2019-08-14 | End: 2021-03-24

## 2019-09-06 DIAGNOSIS — I10 ESSENTIAL HYPERTENSION: ICD-10-CM

## 2019-09-06 DIAGNOSIS — E78.00 HYPERCHOLESTEROLEMIA: ICD-10-CM

## 2019-09-06 DIAGNOSIS — M62.830 MUSCLE SPASM OF BACK: ICD-10-CM

## 2019-09-06 NOTE — TELEPHONE ENCOUNTER
Requested Prescriptions     Pending Prescriptions Disp Refills    atorvastatin (LIPITOR) 20 mg tablet 90 Tab 0     Sig: Take 1 Tab by mouth daily.  baclofen (LIORESAL) 10 mg tablet 90 Tab 1     Sig: Take 1 Tab by mouth three (3) times daily.  lisinopril-hydroCHLOROthiazide (PRINZIDE, ZESTORETIC) 20-12.5 mg per tablet 90 Tab 0     Sig: Take 1 Tab by mouth daily.

## 2019-09-08 RX ORDER — ATORVASTATIN CALCIUM 20 MG/1
20 TABLET, FILM COATED ORAL DAILY
Qty: 90 TAB | Refills: 0 | Status: SHIPPED | OUTPATIENT
Start: 2019-09-08 | End: 2019-12-16 | Stop reason: SDUPTHER

## 2019-09-08 RX ORDER — BACLOFEN 10 MG/1
10 TABLET ORAL 3 TIMES DAILY
Qty: 90 TAB | Refills: 1 | Status: SHIPPED | OUTPATIENT
Start: 2019-09-08 | End: 2019-11-18 | Stop reason: SDUPTHER

## 2019-09-08 RX ORDER — LISINOPRIL AND HYDROCHLOROTHIAZIDE 12.5; 2 MG/1; MG/1
1 TABLET ORAL DAILY
Qty: 90 TAB | Refills: 0 | Status: SHIPPED | OUTPATIENT
Start: 2019-09-08

## 2019-09-18 DIAGNOSIS — M19.90 ARTHRITIS: ICD-10-CM

## 2019-09-18 DIAGNOSIS — K21.9 GASTROESOPHAGEAL REFLUX DISEASE, ESOPHAGITIS PRESENCE NOT SPECIFIED: ICD-10-CM

## 2019-09-20 RX ORDER — CELECOXIB 200 MG/1
200 CAPSULE ORAL 2 TIMES DAILY
Qty: 60 CAP | Refills: 0 | OUTPATIENT
Start: 2019-09-20

## 2019-09-20 RX ORDER — CALC/MAG/B COMPLEX/D3/HERB 61
TABLET ORAL
Qty: 90 CAP | Refills: 1 | Status: SHIPPED | OUTPATIENT
Start: 2019-09-20

## 2019-09-20 NOTE — TELEPHONE ENCOUNTER
2nd request    Requested Prescriptions     Pending Prescriptions Disp Refills    lansoprazole (PREVACID) 15 mg capsule 90 Cap 1     Sig: One cap po daily.  celecoxib (CELEBREX) 200 mg capsule 60 Cap 0     Sig: Take 1 Cap by mouth two (2) times a day.

## 2019-09-24 DIAGNOSIS — M19.90 ARTHRITIS: ICD-10-CM

## 2019-09-26 RX ORDER — CELECOXIB 200 MG/1
200 CAPSULE ORAL 2 TIMES DAILY
Qty: 60 CAP | Refills: 0 | OUTPATIENT
Start: 2019-09-26

## 2019-09-26 NOTE — TELEPHONE ENCOUNTER
I do not feel comfortable continuing to refill this medication as there is CV risk and risk of esophageal bleed with long term use. She needs an apt to discuss. I believe she needs to reschedule a follow-up anyway. Thank you. JUSTICE Harper [facility]. [phone]  [address]

## 2019-10-10 DIAGNOSIS — F32.A DEPRESSION, UNSPECIFIED DEPRESSION TYPE: ICD-10-CM

## 2019-10-10 RX ORDER — VENLAFAXINE HYDROCHLORIDE 75 MG/1
75 CAPSULE, EXTENDED RELEASE ORAL DAILY
Qty: 90 CAP | Refills: 0 | Status: SHIPPED | OUTPATIENT
Start: 2019-10-10 | End: 2019-11-11 | Stop reason: DRUGHIGH

## 2019-10-10 NOTE — TELEPHONE ENCOUNTER
3rd request  Pharmacy faxed this request on 10/3/19 & 10/7/19. Requested Prescriptions     Pending Prescriptions Disp Refills    venlafaxine-SR (EFFEXOR XR) 75 mg capsule 90 Cap 0     Sig: Take 1 Cap by mouth daily.

## 2019-10-23 DIAGNOSIS — I50.30 DIASTOLIC CONGESTIVE HEART FAILURE, UNSPECIFIED HF CHRONICITY (HCC): ICD-10-CM

## 2019-10-23 DIAGNOSIS — J45.909 UNCOMPLICATED ASTHMA, UNSPECIFIED ASTHMA SEVERITY, UNSPECIFIED WHETHER PERSISTENT: ICD-10-CM

## 2019-10-23 NOTE — TELEPHONE ENCOUNTER
10/19/19  Per fax,    Requested Prescriptions     Pending Prescriptions Disp Refills    furosemide (LASIX) 20 mg tablet 90 Tab 0     Sig: Take 1 Tab by mouth daily.

## 2019-10-23 NOTE — TELEPHONE ENCOUNTER
Requested Prescriptions     Pending Prescriptions Disp Refills    furosemide (LASIX) 20 mg tablet 90 Tab 0     Sig: Take 1 Tab by mouth daily.  montelukast (SINGULAIR) 10 mg tablet 90 Tab 0     Sig: Take 1 Tab by mouth daily.

## 2019-10-28 NOTE — TELEPHONE ENCOUNTER
Pt called to check on status of refills. Stated her legs are very swollen. She stated she was once hospitalized with fluid around her lungs and heart & afraid it might happen again. Please fill ASAP. Requested Prescriptions     Pending Prescriptions Disp Refills    furosemide (LASIX) 20 mg tablet 90 Tab 0     Sig: Take 1 Tab by mouth daily.  montelukast (SINGULAIR) 10 mg tablet 90 Tab 0     Sig: Take 1 Tab by mouth daily.

## 2019-10-29 RX ORDER — FUROSEMIDE 20 MG/1
20 TABLET ORAL DAILY
Qty: 90 TAB | Refills: 0 | Status: SHIPPED | OUTPATIENT
Start: 2019-10-29

## 2019-10-29 RX ORDER — MONTELUKAST SODIUM 10 MG/1
10 TABLET ORAL DAILY
Qty: 90 TAB | Refills: 0 | Status: SHIPPED | OUTPATIENT
Start: 2019-10-29 | End: 2020-01-08 | Stop reason: SDUPTHER

## 2019-10-29 NOTE — TELEPHONE ENCOUNTER
This patient no showed to her last apt. Last RF until patient comes in. Pam Mortensen PA-C  [facility]  [phone]  [address]

## 2019-11-11 ENCOUNTER — OFFICE VISIT (OUTPATIENT)
Dept: FAMILY MEDICINE CLINIC | Age: 64
End: 2019-11-11

## 2019-11-11 VITALS
HEIGHT: 63 IN | OXYGEN SATURATION: 94 % | HEART RATE: 73 BPM | WEIGHT: 268 LBS | BODY MASS INDEX: 47.48 KG/M2 | DIASTOLIC BLOOD PRESSURE: 66 MMHG | TEMPERATURE: 99.2 F | RESPIRATION RATE: 18 BRPM | SYSTOLIC BLOOD PRESSURE: 123 MMHG

## 2019-11-11 DIAGNOSIS — I10 ESSENTIAL HYPERTENSION: ICD-10-CM

## 2019-11-11 DIAGNOSIS — Z11.59 ENCOUNTER FOR HEPATITIS C SCREENING TEST FOR LOW RISK PATIENT: ICD-10-CM

## 2019-11-11 DIAGNOSIS — Z23 ENCOUNTER FOR IMMUNIZATION: ICD-10-CM

## 2019-11-11 DIAGNOSIS — E11.9 CONTROLLED TYPE 2 DIABETES MELLITUS WITHOUT COMPLICATION, WITHOUT LONG-TERM CURRENT USE OF INSULIN (HCC): ICD-10-CM

## 2019-11-11 DIAGNOSIS — M54.50 CHRONIC LOW BACK PAIN, UNSPECIFIED BACK PAIN LATERALITY, UNSPECIFIED WHETHER SCIATICA PRESENT: ICD-10-CM

## 2019-11-11 DIAGNOSIS — I50.30 DIASTOLIC CONGESTIVE HEART FAILURE, UNSPECIFIED HF CHRONICITY (HCC): Primary | ICD-10-CM

## 2019-11-11 DIAGNOSIS — G89.29 CHRONIC LOW BACK PAIN, UNSPECIFIED BACK PAIN LATERALITY, UNSPECIFIED WHETHER SCIATICA PRESENT: ICD-10-CM

## 2019-11-11 DIAGNOSIS — Z12.11 ENCOUNTER FOR SCREENING COLONOSCOPY: ICD-10-CM

## 2019-11-11 DIAGNOSIS — F32.A DEPRESSION, UNSPECIFIED DEPRESSION TYPE: ICD-10-CM

## 2019-11-11 DIAGNOSIS — M19.90 ARTHRITIS: ICD-10-CM

## 2019-11-11 RX ORDER — VENLAFAXINE HYDROCHLORIDE 37.5 MG/1
CAPSULE, EXTENDED RELEASE ORAL
Qty: 7 CAP | Refills: 0 | Status: SHIPPED | OUTPATIENT
Start: 2019-11-11 | End: 2019-12-19 | Stop reason: ALTCHOICE

## 2019-11-11 RX ORDER — DULOXETIN HYDROCHLORIDE 30 MG/1
CAPSULE, DELAYED RELEASE ORAL
Qty: 60 CAP | Refills: 0 | Status: SHIPPED | OUTPATIENT
Start: 2019-11-11 | End: 2019-12-18 | Stop reason: SDUPTHER

## 2019-11-11 NOTE — PROGRESS NOTES
Follow Up Visit No  Chief Complaint   Patient presents with    Diabetes       HPI:  Elizabeth Mills is a 71 y.o.  female  has a past medical history of Arthritis, Asthma, Carpal tunnel syndrome, Chronic lung disease, Chronic obstructive pulmonary disease ([facility] [address] Congestive heart failure (CHF) ([facility] [address] COPD (chronic obstructive pulmonary disease) ([facility] [address] GERD (gastroesophageal reflux disease), Hypercholesterolemia, Hypertension, Hypothyroid, Osteopenia, Sleep apnea, Tendonitis, and Thyroid disease. is here for the above complaint(s). DM-2  Key Antihyperglycemic Medications             metFORMIN (GLUCOPHAGE) 500 mg tablet (Taking) Take one tab po daily x 2 weeks, then increase to twice daily. Lab Results   Component Value Date/Time    Hemoglobin A1c 6.5 (H) 03/14/2019 12:45 PM       medication compliance: compliant all of the time    Hypertension/CHF  Patient is currently taking   Key CAD CHF Meds             furosemide (LASIX) 20 mg tablet (Taking) Take 1 Tab by mouth daily. atorvastatin (LIPITOR) 20 mg tablet (Taking) Take 1 Tab by mouth daily. lisinopril-hydroCHLOROthiazide (PRINZIDE, ZESTORETIC) 20-12.5 mg per tablet (Taking) Take 1 Tab by mouth daily. amLODIPine (NORVASC) 10 mg tablet (Taking) Take 1 Tab by mouth daily. . BP today is   BP Readings from Last 1 Encounters:   11/11/19 123/66     Patient has been taking medications as prescribed. Patient is not checking BP at home. Patient DOES NOT follow low salt diet. Patient is NOT currently exercising. Patient denies chest pain, shortness of breath, palpitations, lower extremity edema, dizziness/lightheadedness or syncopal episodes. Key CAD CHF Meds             furosemide (LASIX) 20 mg tablet (Taking) Take 1 Tab by mouth daily. atorvastatin (LIPITOR) 20 mg tablet (Taking) Take 1 Tab by mouth daily. lisinopril-hydroCHLOROthiazide (PRINZIDE, ZESTORETIC) 20-12.5 mg per tablet (Taking) Take 1 Tab by mouth daily. amLODIPine (NORVASC) 10 mg tablet (Taking) Take 1 Tab by mouth daily. Elevated Lipids    Key CAD CHF Meds             furosemide (LASIX) 20 mg tablet (Taking) Take 1 Tab by mouth daily. atorvastatin (LIPITOR) 20 mg tablet (Taking) Take 1 Tab by mouth daily. lisinopril-hydroCHLOROthiazide (PRINZIDE, ZESTORETIC) 20-12.5 mg per tablet (Taking) Take 1 Tab by mouth daily. amLODIPine (NORVASC) 10 mg tablet (Taking) Take 1 Tab by mouth daily. Lab Results   Component Value Date/Time    Cholesterol, total 167 07/13/2018 09:35 AM    HDL Cholesterol 46 07/13/2018 09:35 AM    LDL, calculated 94 07/13/2018 09:35 AM    Triglyceride 133 07/13/2018 09:35 AM    CHOL/HDL Ratio 3.6 07/13/2018 09:35 AM          Obesity    Wt Readings from Last 5 Encounters:   11/11/19 268 lb (121.6 kg)   07/18/19 268 lb (121.6 kg)   06/05/19 270 lb (122.5 kg)   05/24/19 273 lb (123.8 kg)   04/08/19 273 lb 9.6 oz (124.1 kg)     Arthritis  Patient has chronic pain/arthritis in multiple joints. She also has chronic low back pain. She has had surgery on her lower back in the past as well. She has not followed up with a spine specialist since her surgery. Patient has chronic, daily pain in her back, knees, hands (from carpal tunnel), knees. Review of Systems   Constitutional: Negative for chills, fever, malaise/fatigue and weight loss. Eyes: Negative for blurred vision, double vision and pain. Respiratory: Negative for cough, sputum production, shortness of breath and wheezing. Cardiovascular: Negative for chest pain, palpitations, orthopnea, claudication and leg swelling. Gastrointestinal: Negative for abdominal pain, constipation, diarrhea, nausea and vomiting. Genitourinary: Negative for dysuria, frequency and urgency. Musculoskeletal: Positive for back pain and joint pain. Negative for myalgias and neck pain. Neurological: Positive for tingling (hands). Negative for dizziness, tremors and headaches. Psychiatric/Behavioral: Negative for depression, hallucinations, substance abuse and suicidal ideas. The patient is nervous/anxious. Current Outpatient Medications   Medication Sig    furosemide (LASIX) 20 mg tablet Take 1 Tab by mouth daily.  montelukast (SINGULAIR) 10 mg tablet Take 1 Tab by mouth daily.  venlafaxine-SR (EFFEXOR XR) 75 mg capsule Take 1 Cap by mouth daily.  lansoprazole (PREVACID) 15 mg capsule One cap po daily.  atorvastatin (LIPITOR) 20 mg tablet Take 1 Tab by mouth daily.  baclofen (LIORESAL) 10 mg tablet Take 1 Tab by mouth three (3) times daily.  lisinopril-hydroCHLOROthiazide (PRINZIDE, ZESTORETIC) 20-12.5 mg per tablet Take 1 Tab by mouth daily.  loratadine (CLARITIN) 10 mg tablet Take 10 mg by mouth.  fluticasone propionate (FLONASE ALLERGY RELIEF) 50 mcg/actuation nasal spray 2 Sprays by Both Nostrils route daily.  melatonin 5 mg cap capsule Take 2 capsules by mouth at bedtime.  tiotropium (SPIRIVA WITH HANDIHALER) 18 mcg inhalation capsule Take 1 Cap by inhalation daily.  albuterol (PROVENTIL HFA, VENTOLIN HFA, PROAIR HFA) 90 mcg/actuation inhaler Take 1-2 Puffs by inhalation every four (4) hours as needed for Wheezing or Shortness of Breath.  metFORMIN (GLUCOPHAGE) 500 mg tablet Take one tab po daily x 2 weeks, then increase to twice daily.  amLODIPine (NORVASC) 10 mg tablet Take 1 Tab by mouth daily.  potassium 99 mg tablet Take 1 Tab by mouth daily.  traZODone (DESYREL) 100 mg tablet Take 200 mg by mouth nightly.  celecoxib (CELEBREX) 200 mg capsule Take 1 Cap by mouth two (2) times a day.  cefUROXime (CEFTIN) 500 mg tablet Take  by mouth two (2) times a day.  buPROPion SR (WELLBUTRIN SR) 150 mg SR tablet Take 1 Tab by mouth two (2) times a day.  budesonide-formoterol (SYMBICORT) 160-4.5 mcg/actuation HFAA Take 2 Puffs by inhalation two (2) times a day.     budesonide-formoterol (SYMBICORT) 160-4.5 mcg/actuation HFAA Take 2 Puffs by inhalation two (2) times a day.  oxyCODONE-acetaminophen (PERCOCET) 5-325 mg per tablet Take  by mouth every six (6) hours as needed for Pain.  Oxygen     bipap machine kit by Does Not Apply route.  Arm Brace (NEOPRENE WRIST SPLINT SUPPORT) misc To be worn daily.  diphenhydrAMINE (BENADRYL) 25 mg capsule Take 25 mg by mouth every six (6) hours as needed.  oxyCODONE-acetaminophen (PERCOCET) 5-325 mg per tablet Take 1 Tab by mouth every six (6) hours as needed for Pain. Max Daily Amount: 4 Tabs. No current facility-administered medications for this visit. Health Maintenance   Topic Date Due    Hepatitis C Screening  1955    FOOT EXAM Q1  07/18/1964    EYE EXAM RETINAL OR DILATED  07/18/1964    Shingrix Vaccine Age 50> (1 of 2) 07/18/2004    FOBT Q 1 YEAR AGE 50-75  07/18/2004    LIPID PANEL Q1  07/13/2019    GLAUCOMA SCREENING Q2Y  07/18/2019    Pneumococcal 65+ years (1 of 1 - PPSV23) 07/18/2019    Influenza Age 9 to Adult  08/01/2019    HEMOGLOBIN A1C Q6M  09/14/2019    DTaP/Tdap/Td series (2 - Td) 12/07/2019    MICROALBUMIN Q1  03/14/2020    BREAST CANCER SCRN MAMMOGRAM  09/20/2020    Bone Densitometry (Dexa) Screening  Completed     Immunization History   Administered Date(s) Administered    Influenza Vaccine (Quad) PF 12/10/2016       Allergies and Medications: Reviewed and updated in EMR. Past Medical History:   Diagnosis Date    Arthritis     Asthma     Carpal tunnel syndrome     Chronic lung disease     Chronic obstructive pulmonary disease (HCC)     Congestive heart failure (CHF) (HCC)     COPD (chronic obstructive pulmonary disease) (HCC)     GERD (gastroesophageal reflux disease)     Hypercholesterolemia     Hypertension     Hypothyroid     Osteopenia     Sleep apnea     Tendonitis     Thyroid disease        Surgical History: Reviewed and updated in EMR as appropriate.   Social History: Reviewed and updated in EMR as appropriate. Family History: Reviewed and updated in EMR as appropriate. OBJECTIVE:   Visit Vitals  /66   Pulse 73   Temp 99.2 °F (37.3 °C) (Oral)   Resp 18   Ht 5' 3\" (1.6 m)   Wt 268 lb (121.6 kg)   SpO2 94%   BMI 47.47 kg/m²        Physical Exam   Constitutional: She is oriented to person, place, and time and well-developed, well-nourished, and in no distress. No distress. Neck: Normal range of motion. Neck supple. No thyromegaly present. Cardiovascular: Normal rate, regular rhythm, normal heart sounds and intact distal pulses. Exam reveals no gallop and no friction rub. No murmur heard. Pulmonary/Chest: Effort normal and breath sounds normal. No respiratory distress. She has no wheezes. She has no rales. She exhibits no tenderness. Musculoskeletal:        Lumbar back: She exhibits decreased range of motion and pain. She exhibits no tenderness, no bony tenderness, no swelling, no edema, no deformity, no laceration, no spasm and normal pulse. Right hand: She exhibits decreased range of motion. She exhibits no tenderness, no bony tenderness, normal capillary refill, no laceration and no swelling. Decreased sensation noted. Decreased sensation is present in the ulnar distribution. Decreased strength noted. Left hand: She exhibits decreased range of motion. She exhibits no tenderness, no bony tenderness, normal two-point discrimination, normal capillary refill, no deformity, no laceration and no swelling. Decreased sensation noted. Decreased sensation is present in the ulnar distribution. Decreased strength noted. Lymphadenopathy:     She has no cervical adenopathy. Neurological: She is alert and oriented to person, place, and time. Skin: Skin is warm and dry. She is not diaphoretic. Psychiatric: Mood, memory, affect and judgment normal.   Vitals reviewed.       LABS/RADIOLOGICAL TESTS:  Lab Results   Component Value Date/Time    WBC 9.8 03/14/2019 12:45 PM    HGB 15.0 03/14/2019 12:45 PM    HCT 49.8 (H) 03/14/2019 12:45 PM    PLATELET 579 22/20/9443 12:45 PM     Lab Results   Component Value Date/Time    Sodium 139 03/14/2019 12:45 PM    Potassium 4.5 03/14/2019 12:45 PM    Chloride 100 03/14/2019 12:45 PM    CO2 32 03/14/2019 12:45 PM    Glucose 101 (H) 03/14/2019 12:45 PM    BUN 10 03/14/2019 12:45 PM    Creatinine 0.7 07/10/2019 01:30 PM     Lab Results   Component Value Date/Time    Cholesterol, total 167 07/13/2018 09:35 AM    HDL Cholesterol 46 07/13/2018 09:35 AM    LDL, calculated 94 07/13/2018 09:35 AM    Triglyceride 133 07/13/2018 09:35 AM     No results found for: GPT  Lab Results   Component Value Date/Time    Hemoglobin A1c 6.5 (H) 03/14/2019 12:45 PM         All lab results and radiological studies were reviewed and discussed with the patient. ASSESSMENT/PLAN:    Diagnoses and all orders for this visit:    1. Diastolic congestive heart failure, unspecified HF chronicity (HCC)  Continue with   Key CAD CHF Meds             furosemide (LASIX) 20 mg tablet (Taking) Take 1 Tab by mouth daily. atorvastatin (LIPITOR) 20 mg tablet (Taking) Take 1 Tab by mouth daily. lisinopril-hydroCHLOROthiazide (PRINZIDE, ZESTORETIC) 20-12.5 mg per tablet (Taking) Take 1 Tab by mouth daily. amLODIPine (NORVASC) 10 mg tablet (Taking) Take 1 Tab by mouth daily. The importance of optimal blood pressure control discussed with them. Congestive heart failure teaching reiterated today. Advised to limit sodium intake to no more than 2000mg per day and to also watch fluid intake. Advised to weigh daily every morning and record weights. Instructed to call our office if progressive weight gain is noted over a 2 to 3 day period of time, shortness of breath increases, or if abdominal bloating, nausea, fatigue, or increased lower extremity edema is noted. 2. Encounter for hepatitis C screening test for low risk patient  -     HEPATITIS C AB; Future    3.  Controlled type 2 diabetes mellitus without complication, without long-term current use of insulin (HCC)  -     HEMOGLOBIN A1C WITH EAG; Future  Continue with   Key Antihyperglycemic Medications             metFORMIN (GLUCOPHAGE) 500 mg tablet (Taking) Take one tab po daily x 2 weeks, then increase to twice daily. 1)   Goal HBA1C < 7.  2)   Post prandial blood sugar less than or equal to 180.  3)   Exercise 30 min 3-5 times a week for goal BMI of 25.  4)   Please monitor blood sugars as directed and keep a log to bring in with you at each visit. 5)   Diabetic diet discussed and patient instructions to be handed out. 6)   Goal LDL< 100  7)   Goal BP< 120/80 mmhg. 8)   Annual eye exam and foot exam.  9)   Please examine you feet daily for any skin breakages or ulcers. 10) Referral made to see nutritionist for better dietary guidance. 11) Continue current medications as prescribed. 12) Explained the side effects of medication and patient verbalized understanding. 13) Please avoid smoking , alcohol and illicit drug use if applicable to you.  14) Please have blood work ordered today completed. 15) Please make sure you schedule your routine medical exam every 1-2 years. 4. Essential hypertension  Continue with   Key CAD CHF Meds             furosemide (LASIX) 20 mg tablet (Taking) Take 1 Tab by mouth daily. atorvastatin (LIPITOR) 20 mg tablet (Taking) Take 1 Tab by mouth daily. lisinopril-hydroCHLOROthiazide (PRINZIDE, ZESTORETIC) 20-12.5 mg per tablet (Taking) Take 1 Tab by mouth daily. amLODIPine (NORVASC) 10 mg tablet (Taking) Take 1 Tab by mouth daily. 5. Arthritis  -     RHEUMASSURE; Future  -     DULoxetine (CYMBALTA) 30 mg capsule; Take 1 cap by mouth daily x 5 days, then increase to 2 caps by mouth daily.  -     REFERRAL TO PAIN MANAGEMENT    6.  Depression, unspecified depression type  Discussed with patient that would like to discontinue effexor and start cymbalta as this medication helps with chronic pain as well as depression/anxiety. Patient is amenable. -     LIPID PANEL; Future  -     METABOLIC PANEL, COMPREHENSIVE; Future  -     MICROALBUMIN, UR, RAND W/ MICROALB/CREAT RATIO; Future  -     venlafaxine-SR (EFFEXOR-XR) 37.5 mg capsule; Take one cap by mouth daily for 7 days, then stop. Start after stopping effexor - DULoxetine (CYMBALTA) 30 mg capsule; Take 1 cap by mouth daily x 5 days, then increase to 2 caps by mouth daily. 7. Encounter for screening colonoscopy  -     COLOGUARD TEST (FECAL DNA COLORECTAL CANCER SCREENING)    8. Encounter for immunization  -     INFLUENZA VACCINE INACTIVATED (IIV), SUBUNIT, ADJUVANTED, IM    9. Chronic low back pain, unspecified back pain laterality, unspecified whether sciatica present  -     REFERRAL TO PAIN MANAGEMENT          Patient verbalized understanding and agreement with the plan. Patient was given an after-visit summary. Follow-up in 6 weeks or sooner if worsening symptoms. More than 50% of this 40 min visit was spent counseling the patient face to face about etiology and treatment of health conditions outlined in assessment and plan        Pam Mortensen PA-C  [address], [address] [place] [address], [address] [place] [address] [phone]  [facility] [phone]

## 2019-11-11 NOTE — PROGRESS NOTES
Sylvia Sykes presents today for   Chief Complaint   Patient presents with    Diabetes       Is someone accompanying this pt? Daughter in law Shelbi Robison    Is the patient using any DME equipment during [address]? No    Depression Screening:  3 most recent PHQ Screens 11/11/2019   Little interest or pleasure in doing things Not at all   Feeling down, depressed, irritable, or hopeless Not at all   Total Score PHQ 2 0       Learning Assessment:  No flowsheet data found. Abuse Screening:  No flowsheet data found. Fall Risk  Fall Risk Assessment, last 12 mths 11/11/2019   Able to walk? Yes   Fall in past 12 months? No       Health Maintenance reviewed and discussed and ordered per Provider. Health Maintenance Due   Topic Date Due    Hepatitis C Screening  1955    FOOT EXAM Q1  07/18/1964    EYE EXAM RETINAL OR DILATED  07/18/1964    Shingrix Vaccine Age 50> (1 of 2) 07/18/2004    FOBT Q 1 YEAR AGE 50-75  07/18/2004    LIPID PANEL Q1  07/13/2019    GLAUCOMA SCREENING Q2Y  07/18/2019    Pneumococcal 65+ years (1 of 1 - PPSV23) 07/18/2019    Influenza Age 9 to Adult  08/01/2019    HEMOGLOBIN A1C Q6M  09/14/2019   . Coordination of Care:  1. Have you been to the ER, urgent care clinic since your last visit? Hospitalized since your last visit? NO    2. Have you seen or consulted any other health care providers outside of the [address] [place] [address] since your last visit? Include any pap smears or colon screening. NO      Last  Checked na  Last UDS Checked na  Last Pain contract signed: na        Patient received Fluad immunization IM to left deltoid. She tolerated procedure well. Patient was observed for 10 minutes, no adverse effects noted. She left ambulatory with no complaints of pain or distress noted. Patient HAS/HAS NOT: has not received immunizations in office prior to today.         Lot Number [id number]  Expires 05/31/20   Manufacture: Seq[employer]  Dosage: 0.5mL

## 2019-11-18 DIAGNOSIS — M62.830 MUSCLE SPASM OF BACK: ICD-10-CM

## 2019-11-19 NOTE — TELEPHONE ENCOUNTER
Received a call from patient stating that she was at her dental office to have her teeth pulled and needed a letter faxed to them stating that it was ok to pull her teeth. Informed patient that provider is not in the office today and that if a letter is required, patient may need to schedule an appt to be seen. Patient verbalized understanding. spouse

## 2019-11-21 ENCOUNTER — TELEPHONE (OUTPATIENT)
Dept: FAMILY MEDICINE CLINIC | Age: 64
End: 2019-11-21

## 2019-11-21 NOTE — TELEPHONE ENCOUNTER
Pharmacist from [facility] called to check on status of refill requested on 11/18, Monday. I told her I would send the request again to provider. Requested Prescriptions     Pending Prescriptions Disp Refills    baclofen (LIORESAL) 10 mg tablet 90 Tab 1     Sig: Take 1 Tab by mouth three (3) times daily.

## 2019-11-22 RX ORDER — BACLOFEN 10 MG/1
10 TABLET ORAL 3 TIMES DAILY
Qty: 90 TAB | Refills: 1 | Status: SHIPPED | OUTPATIENT
Start: 2019-11-22

## 2019-11-25 NOTE — TELEPHONE ENCOUNTER
We didn't send her there for surgery, I believe we sent her for pain management. Please advise. Pam Mortensen PA-C  [address] [facility]  [phone]  [address]

## 2019-12-02 NOTE — TELEPHONE ENCOUNTER
Left message at [facility] for someone to call back to clarify referral.

## 2019-12-16 DIAGNOSIS — E78.00 HYPERCHOLESTEROLEMIA: ICD-10-CM

## 2019-12-16 DIAGNOSIS — I10 ESSENTIAL HYPERTENSION: ICD-10-CM

## 2019-12-16 NOTE — TELEPHONE ENCOUNTER
Requested Prescriptions     Pending Prescriptions Disp Refills    atorvastatin (LIPITOR) 20 mg tablet 90 Tab 0     Sig: Take 1 Tab by mouth daily.  amLODIPine (NORVASC) 10 mg tablet 90 Tab 0     Sig: Take 1 Tab by mouth daily.

## 2019-12-17 RX ORDER — ATORVASTATIN CALCIUM 20 MG/1
20 TABLET, FILM COATED ORAL DAILY
Qty: 90 TAB | Refills: 0 | Status: SHIPPED | OUTPATIENT
Start: 2019-12-17

## 2019-12-17 RX ORDER — AMLODIPINE BESYLATE 10 MG/1
10 TABLET ORAL DAILY
Qty: 90 TAB | Refills: 0 | Status: SHIPPED | OUTPATIENT
Start: 2019-12-17 | End: 2021-03-24

## 2019-12-17 NOTE — TELEPHONE ENCOUNTER
Pt called to check on status of refills. She stated she is almost out of medication. Requested Prescriptions     Pending Prescriptions Disp Refills    atorvastatin (LIPITOR) 20 mg tablet 90 Tab 0     Sig: Take 1 Tab by mouth daily.  amLODIPine (NORVASC) 10 mg tablet 90 Tab 0     Sig: Take 1 Tab by mouth daily.

## 2019-12-18 DIAGNOSIS — M19.90 ARTHRITIS: ICD-10-CM

## 2019-12-18 DIAGNOSIS — F32.A DEPRESSION, UNSPECIFIED DEPRESSION TYPE: ICD-10-CM

## 2019-12-18 NOTE — TELEPHONE ENCOUNTER
Requested Prescriptions     Pending Prescriptions Disp Refills    DULoxetine (CYMBALTA) 30 mg capsule 60 Cap 0     Sig: Take 1 cap by mouth daily x 5 days, then increase to 2 caps by mouth daily.

## 2019-12-19 RX ORDER — DULOXETIN HYDROCHLORIDE 30 MG/1
30 CAPSULE, DELAYED RELEASE ORAL 2 TIMES DAILY
Qty: 60 CAP | Refills: 2 | Status: SHIPPED | OUTPATIENT
Start: 2019-12-19 | End: 2020-03-18

## 2020-01-08 ENCOUNTER — OFFICE VISIT (OUTPATIENT)
Dept: PULMONOLOGY | Age: 65
End: 2020-01-08

## 2020-01-08 VITALS
RESPIRATION RATE: 21 BRPM | HEART RATE: 85 BPM | WEIGHT: 266 LBS | DIASTOLIC BLOOD PRESSURE: 70 MMHG | BODY MASS INDEX: 47.13 KG/M2 | OXYGEN SATURATION: 93 % | SYSTOLIC BLOOD PRESSURE: 140 MMHG | TEMPERATURE: 98.3 F | HEIGHT: 63 IN

## 2020-01-08 DIAGNOSIS — R91.8 PULMONARY NODULES: ICD-10-CM

## 2020-01-08 DIAGNOSIS — F17.210 CIGARETTE NICOTINE DEPENDENCE WITHOUT COMPLICATION: ICD-10-CM

## 2020-01-08 DIAGNOSIS — R06.09 DYSPNEA ON EXERTION: ICD-10-CM

## 2020-01-08 DIAGNOSIS — Z87.891 PERSONAL HISTORY OF TOBACCO USE, PRESENTING HAZARDS TO HEALTH: ICD-10-CM

## 2020-01-08 DIAGNOSIS — J44.9 COPD WITH ASTHMA (HCC): Chronic | ICD-10-CM

## 2020-01-08 DIAGNOSIS — R91.8 PULMONARY INFILTRATES: Primary | ICD-10-CM

## 2020-01-08 DIAGNOSIS — G47.33 OSA ON CPAP: ICD-10-CM

## 2020-01-08 DIAGNOSIS — J44.9 COPD, GROUP B, BY GOLD 2017 CLASSIFICATION (HCC): ICD-10-CM

## 2020-01-08 DIAGNOSIS — R06.02 SHORTNESS OF BREATH: ICD-10-CM

## 2020-01-08 DIAGNOSIS — Z99.89 OSA ON CPAP: ICD-10-CM

## 2020-01-08 DIAGNOSIS — J96.11 CHRONIC HYPOXEMIC RESPIRATORY FAILURE (HCC): ICD-10-CM

## 2020-01-08 DIAGNOSIS — E66.01 MORBID OBESITY (HCC): ICD-10-CM

## 2020-01-08 RX ORDER — DEXLANSOPRAZOLE 60 MG/1
CAPSULE, DELAYED RELEASE ORAL
COMMUNITY
End: 2021-07-21

## 2020-01-08 RX ORDER — NALOXONE HYDROCHLORIDE 4 MG/.1ML
4 SPRAY NASAL
COMMUNITY
Start: 2019-01-28 | End: 2021-03-24

## 2020-01-08 RX ORDER — ECONAZOLE NITRATE 10 MG/G
CREAM TOPICAL
COMMUNITY
Start: 2009-12-07 | End: 2020-01-08

## 2020-01-08 RX ORDER — KETOTIFEN FUMARATE 0.35 MG/ML
SOLUTION/ DROPS OPHTHALMIC
COMMUNITY
End: 2021-03-24

## 2020-01-08 RX ORDER — ONDANSETRON 4 MG/1
4 TABLET, ORALLY DISINTEGRATING ORAL
COMMUNITY
Start: 2019-01-28 | End: 2020-01-08

## 2020-01-08 RX ORDER — MONTELUKAST SODIUM 10 MG/1
10 TABLET ORAL DAILY
Qty: 90 TAB | Refills: 3 | Status: SHIPPED | OUTPATIENT
Start: 2020-01-08 | End: 2021-02-01 | Stop reason: SDUPTHER

## 2020-01-08 RX ORDER — VENLAFAXINE 75 MG/1
75 TABLET ORAL DAILY
COMMUNITY
End: 2020-02-21

## 2020-01-08 RX ORDER — DESONIDE 0.5 MG/ML
LOTION TOPICAL
COMMUNITY
Start: 2010-01-19 | End: 2020-01-08

## 2020-01-08 NOTE — PROGRESS NOTES
Six Minute Walk Test (6MWT) recording form    Iesha Bello       [id number]                                    1955 female  [id number]    [x]  Medical history checked  [x]  Medical clearance provided for the patient to participate in exercise testing      Contraindications to 6MWT:  [] Resting heart rate > 120 beats / min after 10 minutes rest (relative contraindication)  [] Systolic blood pressure > 180 mm Hg +/- diastolic blood pressure > 100 mm Hg (relative contraindication)  [] Resting SpO2 < 85% on room air or on prescribed level of supplemental oxygen  [] Physical disability preventing safe performance  [x] No contraindications identified             6MWT        1/8/2020  2:55 PM       Supplemental Oxygen  Mobility Aid - None       Time Mins BP SP02 HR RR Distance Walked SOB/Rests/Comments   Rest 140/70 93 85 21  Room air and at rest                         SOB - 4     1  87 99  68 meters Room Air                                           SOB - 6     2  89 108  68 meters 2/lpm nasal 02 continuous flow       SOB - 8     3  91 114  68 meters 3/lpm nasal 02 continuous flow       SOB - 9     4  94 113  34 meters 3/lpm nasal 02 continuous flow       SOB - 9     5  93 115  34 meters 3/lpm nasal 02 continuous flow       SOB - 9     6  92 114  34 meters 3/lpm nasal 02 continuous flow       SOB - 9     Recovery 1 140/78 95 113   Room Air at rest       Recovery 2 140/70 92 71   Room Air at rest       Total distance:          306 meters           Symptom recovery:  2 minutes                   HR recovery:  2 minutes                                    Limiting factor:           None                                   Was test terminated: [x] No   [] Yes  If yes, when?                                                                                                                                                                                   6MWT Termination Criteria:  [] Chest pain or angina-like symptons  [] Heart rate > Predicted HR max.   [] Evolving mental confusioin, light-headedness or incoordination  [] Physical or verbal severe fatigue [] Intolerable dyspnea, unrelieved by rest  [] Persistent SP02 < 85% (Note pending clinical presentation)  [] Abnormal gait pattern (leg cramps, staggering, ataxia)  [] Other clinically warranted reason     INTERPRETATION:          Comparision 6 min walk distance:      RECOMMENDATION:          Zamzam Pepper, RT

## 2020-01-08 NOTE — PROGRESS NOTES
Michael Been presents today for   Chief Complaint   Patient presents with    COPD     follow up from 5/24/2019, CT 7/10/2019    Asthma    Shortness of Breath    Sleep Apnea     on CPAP    Other     hypoxia, RIZVI, pulmonary infiltrate, nicotine dependence       Is someone accompanying this pt? Yes. Family members    Is the patient using any DME equipment during [address]? No    -DME Company N/A    Depression Screening:  3 most recent PHQ Screens 1/8/2020   Little interest or pleasure in doing things Not at all   Feeling down, depressed, irritable, or hopeless Not at all   Total Score PHQ 2 0       Learning Assessment:  Learning Assessment 1/8/2020   PRIMARY LEARNER Patient   PRIMARY LANGUAGE ENGLISH   LEARNER PREFERENCE PRIMARY DEMONSTRATION   ANSWERED BY Patient   RELATIONSHIP SELF       Abuse Screening:  Abuse Screening Questionnaire 1/8/2020   Do you ever feel afraid of your partner? N   Are you in a relationship with someone who physically or mentally threatens you? N   Is it safe for you to go home? Y       Fall Risk  Fall Risk Assessment, last 12 mths 1/8/2020   Able to walk? Yes   Fall in past 12 months? Yes   Fall with injury? Yes   Number of falls in past 12 months 1   Fall Risk Score 2         Coordination of Care:  1. Have you been to the ER, urgent care clinic since your last visit? Hospitalized since your last visit? No    2. Have you seen or consulted any other health care providers outside of the [address] [place] [address] since your last visit? Include any pap smears or colon screening. Yes.  Dr. Lilibeth Turner, podiatrist, PA Corrinne Love, PCP

## 2020-01-08 NOTE — PROGRESS NOTES
[facility] [place] [facility]  Pulmonary, Critical Care, and Sleep Medicine    Pulmonary Office F/U  Name: Jane Godoy 71 y.o. female  MRN: [id number]  : 1955  Service Date: 20  Chief Complaint:   Chief Complaint   Patient presents with    COPD     follow up from 2019, CT 7/10/2019    Asthma    Shortness of Breath    Sleep Apnea     on CPAP    Other     hypoxia, RIZVI, pulmonary infiltrate, nicotine dependence       History of Present Illness:  Jane Godoy is a 71 y.o. female, who presents to Pulmonary clinic for follow-up of COPD, shortness of breath. Patient was last seen in our clinic on 2019. In the interval, pt reports she has had increased cough --increased paroxysms, asociated with yellow sputum. Symptoms only alleviated with rest and PRN albuterol. Dyspnea worsened with fragrances and heat and exertion. No other modifying factors. Symptoms are progressively worsening. PRN albuterol 3-4x per day. Pt reports about one COPD exacerbation in the interval.  Patient continues to smoke, unchanged, 2 packs/day. Patient still having frequent nocturnal awakenings  Patient reports dyspnea with mild exertion- pt unable to walk to the corner. Pt gets short of breath with ADLs -- mMRC 3-4. Progressively worsening over the last few years. Only alleviated by rest, no other modifying factors. Pt reports she has a hx of REAL, wearing CPAP for over 10 years. Pt reports she had another home based sleep study. Pt reports 100% subjective compliance. Denies nausea, vomiting, diarrhea, angina, orthopnea, hemoptysis, abdominal pain, increased LE swelling.       Past Medical History:   Diagnosis Date    Arthritis     Asthma     Carpal tunnel syndrome     Chronic lung disease     Chronic obstructive pulmonary disease (HCC)     Congestive heart failure (CHF) (HCC)     COPD [facility]ch[facility] obstructive pulmonary disease[facility])  Fracture of right foot 01/28. 2019    GERD (gastroesophageal reflux disease)     Hypercholesterolemia     Hypertension     Hypothyroid     Osteopenia     Sleep apnea     Tendonitis     Thyroid disease      Past Surgical History:   Procedure Laterality Date    HX CHOLECYSTECTOMY      HX GYN  1999    vaginal hysterectomy    HX GYN  1977    tubal ligation    HX GYN  1984, 1998    D & C    HX HYSTERECTOMY      HX LUMBAR FUSION      HX ORTHOPAEDIC  01/2019    right foot fracture repair    HX ORTHOPAEDIC      back surgery     Family History   Problem Relation Age of Onset    Emphysema Mother     Cancer Father     Cancer Brother     Cancer Maternal Aunt     Cancer Maternal Uncle     Cancer Paternal Aunt     Cancer Paternal Uncle      Social History     Socioeconomic History    Marital status:      Spouse name: Not on file    Number of children: Not on file    Years of education: Not on file    Highest education level: Not on file   Occupational History    Not on file   Social Needs    Financial resource strain: Not on file    Food insecurity:     Worry: Not on file     Inability: Not on file    Transportation needs:     Medical: Not on file     Non-medical: Not on file   Tobacco Use    Smoking status: Current Every Day Smoker     Packs/day: 1.50     Years: 47.00     Pack years: 70.50    Smokeless tobacco: Never Used   Substance and Sexual Activity    Alcohol use: Yes     Comment: rarely    Drug use: No    Sexual activity: Not on file   Lifestyle    Physical activity:     Days per week: Not on file     Minutes per session: Not on file    Stress: Not on file   Relationships    Social connections:     Talks on phone: Not on file     Gets together: Not on file     Attends Christian service: Not on file     Active member of club or organization: Not on file     Attends meetings of clubs or organizations: Not on file     Relationship status: Not on file    Intimate partner violence:     Fear of current or ex partner: Not on file     Emotionally abused: Not on file     Physically abused: Not on file     Forced sexual activity: Not on file   Other Topics Concern    Not on file   Social History Narrative    ** Merged History Encounter **          Allergies   Allergen Reactions    Codeine Other (comments)     Muscle spasms through chest    Codeine Other (comments)     Muscle spasms in chest     Prior to Admission medications    Medication Sig Start Date End Date Taking? Authorizing Provider   dexlansoprazole (DEXILANT) 60 mg CpDB capsule (delayed release) Dexilant 60 mg capsule, delayed release   Take 1 capsule every day by oral route. Yes Provider, Historical   ketotifen (ZADITOR) 0.025 % (0.035 %) ophthalmic solution Zaditor 0.025 % (0.035 %) eye drops   INSTILL 1 DROP INTO AFFECTED EYE(S) BY OPHTHALMIC ROUTE 2 TIMES PER DAY   Yes Provider, Historical   naloxone (NARCAN) 4 mg/actuation nasal spray 4 mg by IntraNASal route once as needed. 1/28/19  Yes Provider, Historical   venlafaxine (EFFEXOR) 75 mg tablet Take 75 mg by mouth daily. Yes Provider, Historical   DULoxetine (CYMBALTA) 30 mg capsule Take 1 Cap by mouth two (2) times a day for 90 days. 12/19/19 3/18/20 Yes Radha Mortensen PA-C   atorvastatin (LIPITOR) 20 mg tablet Take 1 Tab by mouth daily. 12/17/19  Yes Radha Mortensen PA-C   amLODIPine (NORVASC) 10 mg tablet Take 1 Tab by mouth daily. 12/17/19  Yes Radha Mortensen PA-C   baclofen (LIORESAL) 10 mg tablet Take 1 Tab by mouth three (3) times daily. 11/22/19  Yes Radha Mortensen PA-C   furosemide (LASIX) 20 mg tablet Take 1 Tab by mouth daily. 10/29/19  Yes Pam Mortensen PA-C   montelukast (SINGULAIR) 10 mg tablet Take 1 Tab by mouth daily. 10/29/19  Yes Renita Mortensen PA-C   lansoprazole (PREVACID) 15 mg capsule One cap po daily.  9/20/19  Yes Schappert, Renita Roes, PA-C   lisinopril-hydroCHLOROthiazide (PRINZIDE, ZESTORETIC) 20-12.5 mg per tablet Take 1 Tab by mouth daily. 9/8/19  Yes Aminta Mortensen PA-C   celecoxib (CELEBREX) 200 mg capsule Take 1 Cap by mouth two (2) times a day. 8/14/19  Yes Odette Mortensen PA-C   loratadine (CLARITIN) 10 mg tablet Take 10 mg by mouth daily. Yes Provider, Historical   fluticasone propionate (FLONASE ALLERGY RELIEF) 50 mcg/actuation nasal spray 2 Sprays by Both Nostrils route daily. Yes Provider, Historical   melatonin 5 mg cap capsule Take 2 capsules by mouth at bedtime. Patient taking differently: Take 10 mg by mouth nightly. Take 2 capsules by mouth at bedtime. 7/5/19  Yes Odette Mortensen PA-C   buPROPion SR (WELLBUTRIN SR) 150 mg SR tablet Take 1 Tab by mouth two (2) times a day. 6/5/19  Yes Aminta Mortensen PA-C   tiotropium (SPIRIVA WITH HANDIHALER) 18 mcg inhalation capsule Take 1 Cap by inhalation daily. 6/5/19  Yes Aminta Mortensen PA-C   budesonide-formoterol (SYMBICORT) 160-4.5 mcg/actuation HFAA Take 2 Puffs by inhalation two (2) times a day. 5/24/19  Yes Joana Moise MD   albuterol (PROVENTIL HFA, VENTOLIN HFA, PROAIR HFA) 90 mcg/actuation inhaler Take 1-2 Puffs by inhalation every four (4) hours as needed for Wheezing or Shortness of Breath. 5/24/19  Yes Joana Moise MD   metFORMIN (GLUCOPHAGE) 500 mg tablet Take one tab po daily x 2 weeks, then increase to twice daily. 4/8/19  Yes Pam Mortensen PA-C   potassium 99 mg tablet Take 1 Tab by mouth daily. 3/19/19  Yes Odette Mortensen PA-C   traZODone (DESYREL) 100 mg tablet Take 200 mg by mouth nightly. Yes Provider, Historical   bipap machine kit by Does Not Apply route. Yes Provider, Historical   Arm Brace (NEOPRENE WRIST SPLINT SUPPORT) misc To be worn daily. 3/14/19  Yes Odette Mortensen Lung, JUSTICE   desonide (TRIDESILON) 0.05 % topical lotion Apply  to affected area. 1/19/10 1/8/20  Provider, Historical   econazole nitrate (SPECTAZOLE) 1 % topical cream Apply  to affected area.  12/7/09 1/8/20  Provider, Historical   ondansetron (ZOFRAN ODT) 4 mg disintegrating tablet Take 4 mg by mouth every eight (8) hours as needed. 1/28/19 1/8/20  Provider, Historical   potassium iodide 65 mg tab Take 65 mg by mouth. 1/8/20 1/8/20  Provider, Historical   oxyCODONE-acetaminophen (PERCOCET) 5-325 mg per tablet Take  by mouth every six (6) hours as needed for Pain. 1/8/20 1/8/20  Provider, Historical   Oxygen  1/8/20 1/8/20  Provider, Historical   diphenhydrAMINE (BENADRYL) 25 mg capsule Take 25 mg by mouth every six (6) hours as needed. 1/8/20 1/8/20  Other, MD Baljinder   oxyCODONE-acetaminophen (PERCOCET) 5-325 mg per tablet Take 1 Tab by mouth every six (6) hours as needed for Pain. Max Daily Amount: 4 Tabs. 1/19/19   EMILY Berrios       Immunizations:  I have reviewed the patient's immunizations  Immunization History   Administered Date(s) Administered    Influenza Vaccine (Quad) PF 12/10/2016    Influenza Vaccine (Tri) Adjuvanted 11/11/2019    Novel Influenza-H1N1-09, All Formulations 12/07/2009    Pneumococcal Polysaccharide (PPSV-23) 03/24/2010    Td, Adsorbed 05/21/1999    Tdap 12/07/2009       Review of Systems:  A complete review of systems was performed as stated in the HPI, all others are negative.       Objective:    Physical Exam:  /70 (BP 1 Location: Left arm, BP Patient Position: At rest)   Pulse 85   Temp 98.3 °F (36.8 °C) (Oral)   Resp 21   Ht 5' 3\" (1.6 m)   Wt 120.7 kg (266 lb)   SpO2 93% Comment: room air and at rest  BMI 47.12 kg/m²   Vitals were personally reviewed  Gen: no acute distress, pleasant and cooperative, sitting up in chair, able to climb to exam table w/o difficulty, smells of cigarettes  HEENT: normocephalic/atraumatic, PERRLA, EOMI, no scleral icterus, no oral lesions, poor dentition, Mallampati IV  Neck: supple, trachea midline, no JVD, no cervical and supraclavicular adenopathy  Chest: no lesions, with even rise and fall, no pectus excavatum or flail chest  CVS: regular rate rhythm, S1/S2, no murmurs/rubs/gallops  Lungs: Distant air entry B/L, CTABL, no wheezes/rales/rhonchi  Back: no kyphosis or scoliosis  Abdomen: soft, obese, nontender, bowel sounds present, no hepatosplenomegaly  Ext: no pitting edema B/L, no peripheral cyanosis or clubbing  Neuro: grossly normal, AAOx3, normal strength and coordination grossly, no focal deficits  Skin: no rashes, erythema, lesions  Psych: normal memory, thought content, and processing    Labs: I have reviewed the patient's available labs --  Lab Results   Component Value Date/Time    WBC 9.8 03/14/2019 12:45 PM    HGB 15.0 03/14/2019 12:45 PM    HCT 49.8 (H) 03/14/2019 12:45 PM    PLATELET 335 73/20/2912 12:45 PM    MCV 95.2 03/14/2019 12:45 PM     Lab Results   Component Value Date/Time    Sodium 141 11/18/2019 04:03 PM    Potassium 3.5 11/18/2019 04:03 PM    Chloride 104 11/18/2019 04:03 PM    CO2 31 11/18/2019 04:03 PM    Anion gap 6 11/18/2019 04:03 PM    Glucose 93 11/18/2019 04:03 PM    BUN 12 11/18/2019 04:03 PM    Creatinine 0.7 11/18/2019 04:03 PM    BUN/Creatinine ratio 14 03/14/2019 12:45 PM    GFR est AA >60.0 11/18/2019 04:03 PM    GFR est non-AA >60 11/18/2019 04:03 PM    Calcium 9.6 11/18/2019 04:03 PM    Bilirubin, total 0.4 11/18/2019 04:03 PM    AST (SGOT) 13 (L) 11/18/2019 04:03 PM    Alk. phosphatase 97 11/18/2019 04:03 PM    Protein, total 7.5 11/18/2019 04:03 PM    Albumin 3.5 11/18/2019 04:03 PM    Globulin 3.4 03/14/2019 12:45 PM    A-G Ratio 1.2 03/14/2019 12:45 PM    ALT (SGPT) 24 11/18/2019 04:03 PM       Imaging:  I have personally reviewed patient's imaging --no imaging on file --CT report from 7/10/2019 noted below, noting multiple pulmonary nodules, largest was 7 mm in left upper lobe, as well as tree-in-bud opacities with mucoid impaction.   CT Results (most recent):  Results from Hospital Encounter encounter on 07/10/19   CT CHEST W CONT    Narrative Examination: CT chest with contrast.    INDICATION: Right middle lobe infiltrate. COMPARISON: None    TECHNIQUE:  CT scan of the chest after the administration of intravenous contrast, using  standard protocol. DICOM format image data is available to non-affiliated  external healthcare facilities or entities on a secure, media free, reciprocally  searchable basis with patient authorization for 12 months following the date of  the study. FINDINGS:    Subtle tree-in-bud nodularity noted within the right upper lobe with a few 3-4  mm nodules (image 12 on series 4). Mucoid impaction of the airway leading to be  small nodules (image 13 on series 4). A 4 mm right upper lobe pulmonary nodule  (image 18). A 5 mm right minor fissure associated nodule (image 21). A few  additional right lung fissure associated nodule or densities. A 5 mm left lower  lobe pulmonary nodule (image 34). A 7 mm left upper lobe medial pleural  associated density (image 23). A few scattered areas of subsegmental atelectasis  or scarring within the left lung. A few scattered pleural associated densities  bilaterally. Central airways are clear. No pleural effusions. No lymphadenopathy. The heart is within normal limits. No significant pericardial effusion. Trace  pericardial thickening. Mild to moderate atherosclerotic disease is noted within  the normal caliber thoracic aorta. Thickening and nodularity of the left adrenal gland. Remaining visualized  abdominal structures grossly unremarkable. Impression IMPRESSION:  1. Numerous scattered bilateral pulmonary nodules and pleural associated  densities. A prominent 7 mm left upper lobe medial pleural associated density. Follow-up per Fleischner criteria. 2. Tree-in-bud nodularity within the right upper lobe with apparent mucoid  impaction of the small airway proximally. This is consistent with an infectious  or inflammatory process. 3. Thickening and nodularity of the left adrenal gland. This may reflect adrenal  hyperplasia.     SOLID NODULES    Nodule Size:  < 6 mm  Low-Risk Patient:  No CT followup needed. High-Risk Patient: Optional CT follow-up at 12 months  Low-Risk Patient with multiple nodules: No CT follow-up needed. High-Risk Patient with multiple nodules: Optional CT follow-up at 12 months. Nodule Size: 6-8 mm  Low-Risk Patient: CT at 6-12 months, then consider CT at 18-24 months. High-Risk Patient:  Initial followup at 6-12 months and then at 18-24 months if  no change. Low-Risk Patient with multiple nodules: CT at 3-6 months, then consider CT at  18-24 months if no change. High-Risk Patient with multiple nodules: CT at 3-6 months, then at 18-24 months  if no change. Nodule Size:  >8 mm  Low-Risk Patient: Consider CT, PET/CT, or tissue sampling at 3 months. High-Risk Patient:  Consider CT, PET/CT, or tissue sampling at 3 months. Low-Risk Patient with multiple nodules: CT at 3-6 months, then consider CT at  18-24 months if no change. High-Risk Patient with multiple nodules: CT at 3-6 months, then at 18-24 months  if no change. SUBSOLID NODULES:    Nodule Size:  < 6 mm  Groundglass: No CT followup needed. Part solid: No CT followup needed. Multiple nodules: CT at 3-6 months. If stable, consider CT at 2 and 4 years. Nodule size > 6 mm  Groundglass: CT at 6-12 months to confirm presence. Then CT every 2 years until  5 years. Part solid: CT at 3-6 months to confirm presence. If unchanged and solid  component remains less than 6 mm, annual CT should be performed for 5 years. Multiple nodules: CT at 3-6 months. Subsequent management based on the most  suspicious nodule. PFTs: 5/24/2019: Spirometry is normal without bronchodilator response.     TTE:  I have reviewed the patient's TTE results  Results for orders placed or performed during the hospital encounter of 12/08/16   2D ECHO COMPLETE ADULT (TTE) W OR WO CONTR     Status: None    Narrative Study ID: [id number]                                                    [address]                             Adult Echocardiogram Report    Name: Red Day Date:       2016 08:04 AM  MRN: [id number]                 [id number]  : 1955             Age: 57 yrs  Height: 63 in               Weight: 323 lb                        BSA: 2.4 m2  BP: 184/82 mmHg  Gender: Female              Account #: [de-identified]  Reason For Study: Shortness of Breath  History:    Ordering Physician: Brendan Lucero  Performed By: Sharad Huggins, [facility]    Interpretation Summary  A complete two-dimensional transthoracic echocardiogram was performed (2D, M-  mode, Doppler and color flow Doppler). The study was technically difficult. There are no hemodynamically significant valvular flow abnormalities. The left ventricle is normal in size with normal systolic function and normal  wall thickness. The calculated left ventricular ejection fraction is 60%. There is mild concentric left ventricular hypertrophy. Normal right ventricular size and systolic function. Trivial pericardial effusion which is hemodynamically insignificant. There is no previous echo for comparison,  All other findings are noted below. _____________________________________________________________________________  _      Left Ventricle  The left ventricle is grossly normal in size. There is mild concentric left  ventricular hypertrophy. Left ventricular systolic function is normal. The  calculated left ventricular ejection fraction is 60%. The left ventricular  wall motion is normal.      Right Ventricle  The right ventricle is normal in size and function.     Atria  The left atrial size is normal. Right atrial size is normal. The interatrial  septum is intact with no evidence for an atrial septal defect. Mitral Valve  The mitral valve is normal in structure and function. There is no evidence of  mitral valve prolapse. There is no mitral valve stenosis. There is no mitral  regurgitation noted. Tricuspid Valve  The tricuspid valve is normal in structure and function. There is no  tricuspid valve prolapse. There is no tricuspid stenosis. Right ventricular  systolic pressure is normal.  Aortic Valve  The aortic valve is normal in structure and function. No aortic stenosis. No  aortic regurgitation is present. Pulmonic Valve  The pulmonic valve is not well seen, but is grossly normal. There is no  pulmonic valvular stenosis. There is no pulmonic valvular regurgitation. Great Vessels  The aortic root is normal size. Pericardium/Pleural  Trivial pericardial effusion which is hemodynamically insignificant. MEASUREMENTS/CALCULATIONS:    MMode/2D Measurements & Calculations  IVSd: 1.2 cm                         LVIDd: 5.0 cm                                       LVIDs: 2.9 cm                                       LVPWd: 1.2 cm         _______________________________________________________________    FS: 41.6 %  Doppler Measurements & Calculations  MV E max levi: 108.0 cm/sec                MV dec time: 0.23 sec  MV A max levi: 72.8 cm/sec  MV E/A: 1.5      Electronically signed byDr. Althea Oconnor MD   12/09/2016 10:53 AM         6-minute walk test performed in clinic today, patient ambulated 306 feet, over 6 minutes, lowest SPO2 was 87% which corrected to 93% with 3 L by nasal cannula on exertion        Assessment and Plan:  71 y.o. female with:    Impression:  1. Abnormal CT scan:  Done on 7/10/19, we did not receive any fax of report when this was done and pt did not followup. Pt did not bring disc for review today. It notes multiple pulmonary nodules, tree-in-bud opacities, as well as 7 mm left upper lobe nodule.   Given smoking history, patient is high risk. Etiology of tree-in-bud opacities and other nodules likely infectious, 7 mm nodule is concerning. 2.  COPD/asthma overlap syndrome: Based on symptoms, patient has gold risk category B COPD  3. Dyspnea/shortness of breath: Patient has severe dyspnea (mMRC of 3 to 4) which is multifactorial, due to COPD/asthma, morbid obesity, REAL/OHV, and suspected cardiomyopathy  4. Abnormal chest x-ray: Preoperative chest x-ray performed today in clinic shows hazy infiltrate in right middle lobe. Etiology unclear, patient does not endorse any infectious symptoms. 5.  REAL: Patient on CPAP, follows with Dr.H. Riya Chacon patient reports full subjective compliance  6. Morbid obesity: Body mass index is 47.12 kg/m². 7.  Tobacco dependence: Patient is a 1.75 pack/day smoker, at max 3 pack/day. Patient likely has over 100-pack-year smoking history  8. Hypoxia: Etiology due to above. Seen on 6min walk test today    Plan:  -Reviewed CT scan report with patient, patient will need repeat CT scan done which will be about 6 months from previous scan. This should be adequate to evaluate any changes regarding 7 mm left upper lobe nodule. Advised patient that if she has this done in [facility], she ABSOLUTELY needs to bring CD with her so that I may evaluate nature of nodules and determine further work-up. -6-minute walk test performed and reviewed in clinic today, lowest SPO2 was 87% on room air, corrected to 93% on 3 L with exertion. Will prescribe patient 3 L by nasal cannula with exertion and nightly blended into CPAP. Otherwise I will defer nocturnal oxygen to her sleep physician Jenny Miranda.  -Continue Spiriva HandiHaler 1 puff once daily. Counseled patient to use every day  -Continue Symbicort 160/4.5 MCG 2 puffs twice daily with spacer. Counseled patient to rinse mouth thoroughly after each use and wash and dry spacer thoroughly after each use.   -Continue singulair  -Continue albuterol HFA 1-2puffs q4-6h PRN. Counseled patient that this is their rescue inhaler. Also counseled patient regarding premedication 15-30m prior to exercise or exposure to very cold air  -Counseled patient on proper inhaler technique  -Counseled patient to avoid potential triggers of asthma.  -Immunizations reviewed, influenza vaccination up-to-date. Patient will need Prevnar 13 in July 2020  -Counseled patient to follow back up with cardiology-given her ongoing dyspnea, history of CHF exacerbation, and other comorbidities  -I spent 13 minutes with patient regarding cessation of smoking cigarettes. I reviewed health risks of tobacco use including increased risk of MI, stroke, cancer, etc.  We reviewed various approaches to cessation including pills, patches, inhaler, gum, weaning self, \"cold turkey\", and smoking cessation classes. Pt declined cessation assistance at this time,      Follow-up and Dispositions    · Return in about 4 weeks (around 2/5/2020).        Orders Placed This Encounter    AMB POC PFT COMPLETE W/BRONCHODILATOR    AMB POC PFT COMPLETE W/O BRONCHODILATOR    GAS DILUT/WASHOUT LUNG VOL W/WO DISTRIB VENT&VOL    DIFFUSING CAPACITY    AMB SUPPLY ORDER    CT CHEST W CONT    montelukast (SINGULAIR) 10 mg tablet         Karla Vail MD/MPH     Pulmonary, Critical Care Medicine  [facility]

## 2020-01-08 NOTE — LETTER
1/12/20 Patient: Ashanti Burris YOB: 1955 Date of Visit: 1/8/2020 Lloyd West PA-C 
[address] [facility] [address] [place], [place] [address]lynn [id number] VIA In Basket Dear Lloyd West PA-C, Thank you for referring Ms. Ashanti Burris to [address] [place] [address] for evaluation. My notes for this consultation are attached. If you have questions, please do not hesitate to call me. I look forward to following your patient along with you. Sincerely, Sujata Rodriges MD

## 2020-01-09 ENCOUNTER — TELEPHONE (OUTPATIENT)
Dept: PULMONOLOGY | Age: 65
End: 2020-01-09

## 2020-02-21 ENCOUNTER — OFFICE VISIT (OUTPATIENT)
Dept: PULMONOLOGY | Age: 65
End: 2020-02-21

## 2020-02-21 VITALS
TEMPERATURE: 98.5 F | HEIGHT: 63 IN | BODY MASS INDEX: 46.42 KG/M2 | HEART RATE: 77 BPM | OXYGEN SATURATION: 94 % | WEIGHT: 262 LBS | SYSTOLIC BLOOD PRESSURE: 130 MMHG | DIASTOLIC BLOOD PRESSURE: 60 MMHG | RESPIRATION RATE: 20 BRPM

## 2020-02-21 DIAGNOSIS — G47.33 OSA ON CPAP: ICD-10-CM

## 2020-02-21 DIAGNOSIS — R06.02 SHORTNESS OF BREATH: ICD-10-CM

## 2020-02-21 DIAGNOSIS — Z01.811 PREOP PULMONARY/RESPIRATORY EXAM: ICD-10-CM

## 2020-02-21 DIAGNOSIS — J44.9 COPD WITH ASTHMA (HCC): Primary | Chronic | ICD-10-CM

## 2020-02-21 DIAGNOSIS — R91.8 PULMONARY NODULES: ICD-10-CM

## 2020-02-21 DIAGNOSIS — E66.01 MORBID OBESITY (HCC): ICD-10-CM

## 2020-02-21 DIAGNOSIS — Z23 ENCOUNTER FOR IMMUNIZATION: ICD-10-CM

## 2020-02-21 DIAGNOSIS — Z87.891 PERSONAL HISTORY OF TOBACCO USE, PRESENTING HAZARDS TO HEALTH: ICD-10-CM

## 2020-02-21 DIAGNOSIS — R06.09 DYSPNEA ON EXERTION: ICD-10-CM

## 2020-02-21 DIAGNOSIS — J44.9 COPD, GROUP B, BY GOLD 2017 CLASSIFICATION (HCC): ICD-10-CM

## 2020-02-21 DIAGNOSIS — F17.210 NICOTINE DEPENDENCE, CIGARETTES, UNCOMPLICATED: ICD-10-CM

## 2020-02-21 DIAGNOSIS — Z99.89 OSA ON CPAP: ICD-10-CM

## 2020-02-21 RX ORDER — ALBUTEROL SULFATE 90 UG/1
1-2 AEROSOL, METERED RESPIRATORY (INHALATION)
Qty: 3 INHALER | Refills: 3 | Status: SHIPPED | OUTPATIENT
Start: 2020-02-21

## 2020-02-21 NOTE — PROGRESS NOTES
[address] [facility] [place] [facility]  Pulmonary, Critical Care, and Sleep Medicine    Pulmonary Office F/U  Name: Brian Teixeira 71 y.o. female  MRN: [id number]  : 1955  Service Date: 20  Chief Complaint:   Chief Complaint   Patient presents with    Asthma     F/U to  PFT today [facility]     COPD       History of Present Illness:  Brian Teixeira is a 71 y.o. female, who presents to Pulmonary clinic for follow-up of COPD, shortness of breath. Patient was last seen in our clinic on 2020. In the interval, pt reports she is somewhat better with regards to dyspnea. Pt is using PRN albuterol less. Patient reports she is using albuterol 3-4 times every couple of weeks now instead of on a daily basis. Patient denies any exacerbations interval  Patient reports infrequent nocturnal awakenings. Patient reports she is compliant with Symbicort and Spiriva. Pt reports sedentary lifestyle due to foot pain for which she states she needs surgeyr for. Pt rpeorts can't use a wlaker due to shouler pain. Patient reports she cut back smoking from 1.75 packs/day down to 1PPD smoker. Patient continues to report dyspnea with mild exertion- pt unable to walk to the corner. Pt gets short of breath with ADLs -- mMRC 3-4. Progressively worsening over the last few years. Only alleviated by rest, no other modifying factors. Pt reports she has a hx of REAL, wearing CPAP for over 10 years. Pt reports 100% subjective compliance. Pt reports increased LE swelling  Denies nausea, vomiting, diarrhea, angina, orthopnea, hemoptysis, abdominal pain. Past Medical History:   Diagnosis Date    Arthritis     Asthma     Carpal tunnel syndrome     Chronic lung disease     Chronic obstructive pulmonary disease (HCC)     Congestive heart failure (CHF) (HCC)     COPD (chronic obstructive pulmonary disease) (HCC)     Fracture of right foot 2019    GERD (gastroesophageal reflux disease)     Hypercholesterolemia     Hypertension     Hypothyroid     Osteopenia     Sleep apnea     Tendonitis     Thyroid disease      Past Surgical History:   Procedure Laterality Date    HX CHOLECYSTECTOMY      HX GYN  1999    vaginal hysterectomy    HX GYN  1977    tubal ligation    HX GYN  1984, 1998    D & C    HX HYSTERECTOMY      HX LUMBAR FUSION      HX ORTHOPAEDIC  01/2019    right foot fracture repair    HX ORTHOPAEDIC      back surgery     Family History   Problem Relation Age of Onset    Emphysema Mother     Cancer Father     Cancer Brother     Cancer Maternal Aunt     Cancer Maternal Uncle     Cancer Paternal Aunt     Cancer Paternal Uncle      Social History     Socioeconomic History    Marital status:      Spouse name: Not on file    Number of children: Not on file    Years of education: Not on file    Highest education level: Not on file   Occupational History    Not on file   Social Needs    Financial resource strain: Not on file    Food insecurity:     Worry: Not on file     Inability: Not on file    Transportation needs:     Medical: Not on file     Non-medical: Not on file   Tobacco Use    Smoking status: Current Every Day Smoker     Packs/day: 1.50     Years: 47.00     Pack years: 70.50    Smokeless tobacco: Never Used   Substance and Sexual Activity    Alcohol use: Yes     Comment: rarely    Drug use: No    Sexual activity: Not on file   Lifestyle    Physical activity:     Days per week: Not on file     Minutes per session: Not on file    Stress: Not on file   Relationships    Social connections:     Talks on phone: Not on file     Gets together: Not on file     Attends Baptism service: Not on file     Active member of club or organization: Not on file     Attends meetings of clubs or organizations: Not on file     Relationship status: Not on file    Intimate partner violence:     Fear of current or ex partner: Not on file     Emotionally abused: Not on file     Physically abused: Not on file     Forced sexual activity: Not on file   Other Topics Concern    Not on file   Social History Narrative    ** Merged History Encounter **          Allergies   Allergen Reactions    Codeine Other (comments)     Muscle spasms through chest    Codeine Other (comments)     Muscle spasms in chest     Prior to Admission medications    Medication Sig Start Date End Date Taking? Authorizing Provider   dexlansoprazole (DEXILANT) 60 mg CpDB capsule (delayed release) Dexilant 60 mg capsule, delayed release   Take 1 capsule every day by oral route. Provider, Historical   ketotifen (ZADITOR) 0.025 % (0.035 %) ophthalmic solution Zaditor 0.025 % (0.035 %) eye drops   INSTILL 1 DROP INTO AFFECTED EYE(S) BY OPHTHALMIC ROUTE 2 TIMES PER DAY    Provider, Historical   naloxone (NARCAN) 4 mg/actuation nasal spray 4 mg by IntraNASal route once as needed. 1/28/19   Provider, Historical   venlafaxine (EFFEXOR) 75 mg tablet Take 75 mg by mouth daily. Provider, Historical   montelukast (SINGULAIR) 10 mg tablet Take 1 Tab by mouth daily. 1/8/20   Chidi Wall MD   DULoxetine (CYMBALTA) 30 mg capsule Take 1 Cap by mouth two (2) times a day for 90 days. 12/19/19 3/18/20  Javon Mortensen PA-C   atorvastatin (LIPITOR) 20 mg tablet Take 1 Tab by mouth daily. 12/17/19   Javon Mortensen PA-C   amLODIPine (NORVASC) 10 mg tablet Take 1 Tab by mouth daily. 12/17/19   Javon Mortensen PA-C   baclofen (LIORESAL) 10 mg tablet Take 1 Tab by mouth three (3) times daily. 11/22/19   Javon Mortensen PA-C   furosemide (LASIX) 20 mg tablet Take 1 Tab by mouth daily. 10/29/19   Javon Mortensen PA-C   lansoprazole (PREVACID) 15 mg capsule One cap po daily. 9/20/19   Javon Mortensen PA-C   lisinopril-hydroCHLOROthiazide (PRINZIDE, ZESTORETIC) 20-12.5 mg per tablet Take 1 Tab by mouth daily.  9/8/19   Javon Mortensen PA-C   celecoxib (CELEBREX) 200 mg capsule Take 1 Cap by mouth two (2) times a day. 8/14/19   Schappert, Benuel Sacks, PA-C   loratadine (CLARITIN) 10 mg tablet Take 10 mg by mouth daily. Provider, Historical   fluticasone propionate (FLONASE ALLERGY RELIEF) 50 mcg/actuation nasal spray 2 Sprays by Both Nostrils route daily. Provider, Historical   melatonin 5 mg cap capsule Take 2 capsules by mouth at bedtime. Patient taking differently: Take 10 mg by mouth nightly. Take 2 capsules by mouth at bedtime. 7/5/19   Schappert, Benuel Sacks, PA-C   buPROPion SR (WELLBUTRIN SR) 150 mg SR tablet Take 1 Tab by mouth two (2) times a day. 6/5/19   Josie Mortensen PA-C   tiotropium (SPIRIVA WITH HANDIHALER) 18 mcg inhalation capsule Take 1 Cap by inhalation daily. 6/5/19   Schappert, Benuel Sacks, PA-C   budesonide-formoterol (SYMBICORT) 160-4.5 mcg/actuation HFAA Take 2 Puffs by inhalation two (2) times a day. 5/24/19   Rigoberto Given, MD   albuterol (PROVENTIL HFA, VENTOLIN HFA, PROAIR HFA) 90 mcg/actuation inhaler Take 1-2 Puffs by inhalation every four (4) hours as needed for Wheezing or Shortness of Breath. 5/24/19   Rigoberto Given, MD   metFORMIN (GLUCOPHAGE) 500 mg tablet Take one tab po daily x 2 weeks, then increase to twice daily. 4/8/19   Schappert, Benuel Sacks, PA-C   potassium 99 mg tablet Take 1 Tab by mouth daily. 3/19/19   Schappert, Benuel Sacks, PA-C   traZODone (DESYREL) 100 mg tablet Take 200 mg by mouth nightly. Provider, Historical   bipap machine kit by Does Not Apply route. Provider, Historical   Arm Brace (NEOPRENE WRIST SPLINT SUPPORT) misc To be worn daily. 3/14/19   Schappert, Benuel Sacks, PA-C   oxyCODONE-acetaminophen (PERCOCET) 5-325 mg per tablet Take 1 Tab by mouth every six (6) hours as needed for Pain. Max Daily Amount: 4 Tabs.  1/19/19   EMILY Benson       Immunizations:  I have reviewed the patient's immunizations  Immunization History   Administered Date(s) Administered    Influenza Vaccine (Quad) PF 12/10/2016    Influenza Vaccine (Tri) Adjuvanted 11/11/2019    Novel Influenza-H1N1-09, All Formulations 12/07/2009    Pneumococcal Polysaccharide (PPSV-23) 03/24/2010    Td, Adsorbed 05/21/1999    Tdap 12/07/2009       Review of Systems:  A complete review of systems was performed as stated in the HPI, all others are negative. Objective:    Physical Exam:  /60 (BP 1 Location: Left arm, BP Patient Position: At rest)   Pulse 77   Temp 98.5 °F (36.9 °C) (Oral)   Resp 20   Ht 5' 3\" (1.6 m)   Wt 118.8 kg (262 lb)   SpO2 94%   BMI 46.41 kg/m²   Vitals were personally reviewed  Gen: no acute distress, pleasant and cooperative, sitting up in chair, able to climb to exam table w/o difficulty, smells of cigarettes  HEENT: normocephalic/atraumatic, PERRLA, EOMI, no scleral icterus, no oral lesions, poor dentition, Mallampati IV  Neck: supple, trachea midline, no JVD, no cervical and supraclavicular adenopathy  Chest: no lesions, with even rise and fall, no pectus excavatum or flail chest  CVS: regular rate rhythm, S1/S2, no murmurs/rubs/gallops  Lungs: Distant air entry B/L, CTABL, no wheezes/rales/rhonchi  Back: no kyphosis or scoliosis  Abdomen: soft, obese, nontender, bowel sounds present, no hepatosplenomegaly  Ext: 2+ pitting edema B/L, no peripheral cyanosis or clubbing  Neuro: grossly normal, AAOx3, normal strength and coordination grossly, no focal deficits  Skin: no rashes, erythema, lesions  Psych: normal memory, thought content, and processing    Labs:   I have reviewed the patient's available labs --  Lab Results   Component Value Date/Time    WBC 9.8 03/14/2019 12:45 PM    HGB 15.0 03/14/2019 12:45 PM    HCT 49.8 (H) 03/14/2019 12:45 PM    PLATELET 404 84/50/2044 12:45 PM    MCV 95.2 03/14/2019 12:45 PM     Lab Results   Component Value Date/Time    Sodium 141 11/18/2019 04:03 PM    Potassium 3.5 11/18/2019 04:03 PM    Chloride 104 11/18/2019 04:03 PM    CO2 31 11/18/2019 04:03 PM    Anion gap 6 11/18/2019 04:03 PM Glucose 93 11/18/2019 04:03 PM    BUN 12 11/18/2019 04:03 PM    Creatinine 0.7 11/18/2019 04:03 PM    BUN/Creatinine ratio 14 03/14/2019 12:45 PM    GFR est AA >60.0 11/18/2019 04:03 PM    GFR est non-AA >60 11/18/2019 04:03 PM    Calcium 9.6 11/18/2019 04:03 PM    Bilirubin, total 0.4 11/18/2019 04:03 PM    AST (SGOT) 13 (L) 11/18/2019 04:03 PM    Alk. phosphatase 97 11/18/2019 04:03 PM    Protein, total 7.5 11/18/2019 04:03 PM    Albumin 3.5 11/18/2019 04:03 PM    Globulin 3.4 03/14/2019 12:45 PM    A-G Ratio 1.2 03/14/2019 12:45 PM    ALT (SGPT) 24 11/18/2019 04:03 PM       Imaging:  I have personally reviewed patient's imaging --shows bilateral emphysematous changes more pronounced in upper lobes, centrilobular nature. Patient has some mild tree-in-bud opacities, and scattered subcentimeter nodules bilaterally, with one 6 to 7 mm nodule in the left lingula medially abutting the pericardium. No masses, consolidations, effusions, infiltrates were seen. Official report per radiology:  CT Results (most recent):  Results from Hospital Encounter encounter on 02/13/20   CT CHEST WO CONT    Narrative INDICATION: F/U pulm nodules;  pulmonary nodules  COMPARISON: Prior chest CT dated 7/10/2019  TECHNIQUE: CT of the Chest with IV contrast. Sagittal and coronal  reconstructions. DICOM format image data is available to non-affiliated external  healthcare facilities or entities on a secure, media free, reciprocally  searchable basis with patient authorization for 12 months following the date of  the study. FINDINGS:  Central airways are patent. Scattered areas of linear and dependent atelectasis  seen throughout the bilateral lungs. Subtle tree-in-bud nodularity noted within  the right upper lobe with a few 3-4 mm pulmonary nodules. Multiple bilateral  pulmonary nodules measuring in the range of 3-7 mm. These have solid tip part  solid morphology. These are unchanged. No new pulmonary nodules.  Scattered areas  of subsegmental atelectasis and scarring bilaterally. No pleural effusion. There is mild pericardial effusion. Left adrenal gland  thickening and nodularity, similar to prior study. Impression IMPRESSION:  1.  Multiple bilateral pulmonary nodules. Largest nodule measures 7 mm. Findings  are similar to previous study. No new nodules. Continued surveillance using  Fleischner criteria is recommended. 2.  Tree in bud nodularity in the right upper lobe, unchanged. This could  reflect infectious or inflammatory bronchiolitis. 3.  Mild pericardial effusion. 4.  Left adrenal gland thickening/nodularity, unchanged. Guidelines for Management of Incidental Pulmonary Nodules Detected on CT Images:  From the Fleischner Society 2017    SOLID NODULES    Nodule Size:  < 6 mm  Low-Risk Patient:  No CT followup needed. High-Risk Patient: Optional CT follow-up at 12 months  Low-Risk Patient with multiple nodules: No CT follow-up needed. High-Risk Patient with multiple nodules: Optional CT follow-up at 12 months. Nodule Size: 6-8 mm  Low-Risk Patient: CT at 6-12 months, then consider CT at 18-24 months. High-Risk Patient:  Initial followup at 6-12 months and then at 18-24 months if  no change. Low-Risk Patient with multiple nodules: CT at 3-6 months, then consider CT at  18-24 months if no change. High-Risk Patient with multiple nodules: CT at 3-6 months, then at 18-24 months  if no change. Nodule Size:  >8 mm  Low-Risk Patient: Consider CT, PET/CT, or tissue sampling at 3 months. High-Risk Patient:  Consider CT, PET/CT, or tissue sampling at 3 months. Low-Risk Patient with multiple nodules: CT at 3-6 months, then consider CT at  18-24 months if no change. High-Risk Patient with multiple nodules: CT at 3-6 months, then at 18-24 months  if no change. SUBSOLID NODULES:    Nodule Size:  < 6 mm  Groundglass: No CT followup needed. Part solid: No CT followup needed. Multiple nodules: CT at 3-6 months. If stable, consider CT at 2 and 4 years. Nodule size > 6 mm  Groundglass: CT at 6-12 months to confirm presence. Then CT every 2 years until  5 years. Part solid: CT at 3-6 months to confirm presence. If unchanged and solid  component remains less than 6 mm, annual CT should be performed for 5 years. Multiple nodules: CT at 3-6 months. Subsequent management based on the most  suspicious nodule. PLEASE NOTE DISTINCTION BETWEEN SOLID AND SUBSOLID NODULES. PFTs: Personally reviewed from today, spirometry shows a mild obstructive defect, no BD response, lung volumes are normal, diffusion capacity is moderately reduced. TTE:  I have reviewed the patient's TTE results                                                               Study ID: [id number]                                 Adult Echocardiogram Report     Name: Bhavik Parra Date:       04/10/2019 10:32 AM  MRN: [id number]                 Patient Location: [facility]^[id number]  : 1955             Age: 58 yrs  Height: 63 in               Weight: 273 lb                        BSA: 2.2 m2  BP: 163/75 mmHg             HR: 77  Gender: Female              Account #: [de-identified]  Reason For Study: HEART FAILURE  History: HTN, HLD, DM, COPD  Ordering Physician: Airam Kern  Referring Physician: Laura Young  Performed By: Brigitte Person RDMS     Interpretation Summary  A complete two-dimensional transthoracic echocardiogram was performed (2D, M-  mode, Doppler and color flow Doppler). The study was technically difficult. Compared to prior study, there is no significant change.   The left ventricle is normal in size and systolic function. The calculated left ventricular ejection fraction is 63%. Based on the transmitral spectral Doppler flow pattern the diastolic function  is Grade 1. The right ventricle is normal. TAPSE 2.2 cm. Mildly dilated left atrium. Left atrial volume index: 35ml/m2. No significant valvular flow abnormalities noted. All other findings listed below. Compared to study of 12/9/2016, LVEF was 60%.     _____________________________________________________________________________  _        Left Ventricle  The left ventricle is normal in size. There is mild concentric left  ventricular hypertrophy. Left ventricular systolic function is normal. The  calculated left ventricular ejection fraction is 63%. Based on the  transmitral spectral Doppler flow pattern the diastolic function is Grade 1.        Right Ventricle  The right ventricle is normal size. There is normal right ventricular wall  thickness. Right ventricular systolic function is normal: TAPSE: 2.2cm.    Atria  The left atrium is mildly dilated. Left atrial volume index: 35ml/m2. Right  atrial size is normal. Right atrial volume index 26.4ml/m2. The interatrial  septum is intact with no evidence for an atrial septal defect.     Mitral Valve  The mitral valve is grossly normal. There is no evidence of mitral valve  prolapse. There is no mitral valve stenosis. There is trivial mitral  regurgitation.     Tricuspid Valve  The tricuspid valve is not well visualized, but is grossly normal. There is  no tricuspid valve prolapse. There is no tricuspid stenosis. No tricuspid  regurgitation. Aortic Valve  The aortic valve is not well visualized. No aortic stenosis. No aortic  regurgitation is present.     Pulmonic Valve  The pulmonic valve is not well visualized. There is no pulmonic valvular  stenosis. There is no pulmonic valvular regurgitation.     Great Vessels  The aortic root is normal size.  The pulmonary is not well visualized.     Pericardium/Pleural  Trivial pericardial effusion which is hemodynamically insignificant.     MEASUREMENTS/CALCULATIONS:     MMode/2D Measurements & Calculations  RVDd: 4.6 cm                                LVIDd: 5.6 cm  IVSd: 1.3 cm                                LVIDs: 3.4 cm                                              LVPWd: 1.4 cm         _______________________________________________________________     FS: 39.1 %                                  RVDd major: 8.4 cm                                              RVDd minor: 3.5 cm         _______________________________________________________________     Ao root diam: 2.6 cm                        LVOT diam: 2.1 cm  Ao root area: 5.5 cm  LA dimension: 4.4 cm         _______________________________________________________________     TAPSE: 2.2 cm                               IVC Diameter: 1.8 cm         _______________________________________________________________     RA A4Cs: 21.0 cm2                           LA ESV (BP): 66.6 ml            _______________________________________________________________                                              LV EF (MOD-BP): 60.9 %  LA ESV Index (BP): 27.5 ml/m2         _______________________________________________________________     RA ESV Index (A4C): 26.4 ml/m2     Doppler Measurements & Calculations  MV E max kvng: 120.5 cm/sec          MV P1/2t max kvng: 121.5 cm/sec  MV A max kvng: 96.7 cm/sec           MV P1/2t: 53.6 msec  MV E/A: 1.2                         MVA(P1/2t): 4.1 cm                                      MV dec slope: 663.9 cm/sec                                      MV dec time: 0.18 sec            _______________________________________________________________  Lat Peak E' Kvng: 6.7 cm/sec         Med Peak E' Kvng: 5.7 cm/sec            _______________________________________________________________  Ao V2 max: 200.0 cm/sec             LV V1 max P.9 mmHg  Ao max P.0 mmHg LV V1 max: 140.3 cm/sec     TAI(V,D): 2.4 cm2         _______________________________________________________________     PA V2 max: 87.8 cm/sec              AV VR: 0.70  PA max PG: 3.1 mmHg         _______________________________________________________________     MV P1/2t-pr: 53.6 msec              RV S Kvng: 16.1 cm/sec         _______________________________________________________________     TAI Dim Index: 0.70                 E/E' lat: 17.9         _______________________________________________________________     E/E' med: 21.0                                Dr. Lakisha Woods. Sharon Kumar MD   04/10/2019 11:57  Electronically signed byAM      Assessment and Plan:  71 y.o. female with:    Impression:  1. Pulmonary nodules: Seen on CT scan from 7/10/2019. Nodules stable on repeat repeat CT chest from 2/13/2020. Largest nodule 7 mm in nature, and left lingula medially. Tree-in-bud opacities are very mild, appear more nodular, stable in size. 2.  COPD/asthma overlap syndrome: Based on symptoms, patient has gold risk category B COPD  3. Dyspnea/shortness of breath: Multifactorial, due to COPD/asthma, morbid obesity, REAL/OHV, and cardiomyopathy  4. Chronic hypoxia: Patient on 3 L with exertion and nightly with CPAP  5. REAL: Patient on CPAP, follows with Dr.H. Nunu Ross patient reports full subjective compliance  6. Morbid obesity: Body mass index is 46.41 kg/m². 7.  Tobacco dependence: Patient cut down to 1 pack/day, at max 3 pack/day. Patient likely has over 100-pack-year smoking history  8. Pulmonary preoperative risk assessment for foot arthroplasty      Plan:  -pt declines pulm rehab  -Repeat CT chest in Feb 2021  -For preoperative pulmonary risk assessment, pt is an intermediate to high risk for pulmonary complications in the perioperative and postoperative periods. This is based on ARISCAT (Canet) risk index, as well as independent risk factor of REAL (which is controlled).   Potential pulmonary complications include postoperative hypoxia, atelectasis, infection, exacerbation of COPD, and respiratory failure (both prolonged mechanical ventilation and unplanned reintubation). I have informed the patient of the risks and they are agreeable to proceed with surgery. All of their questions were answered. In order to optimize the patient's outcomes, I would recommend that:   Pt receive scheduled bronchodilators in the perioperative and postoperative period   Pt receive noninvasive positive pressure ventilation (CPAP or BiPAP) in the immediate postoperative period   Avoid use of neuromuscular blockers if possible   Avoid overuse of opioid and sedating medications in the postoperative period   Aggressive pulmonary toileting   Frequent incentive spirometry   Out of bed as soon as possible with early ambulation and involvement of physical therapy   DVT prophylaxis as soon as possible per the surgeon  -Continue supplemental oxygen 3 L by nasal cannula with exertion as well as nightly.  -Continue Spiriva HandiHaler 1 puff once daily. Counseled patient to use every day  -Continue Symbicort 160/4.5 MCG 2 puffs twice daily with spacer. Counseled patient to rinse mouth thoroughly after each use and wash and dry spacer thoroughly after each use. -Continue singulair  -Continue albuterol HFA 1-2puffs q4-6h PRN. Counseled patient that this is their rescue inhaler. Also counseled patient regarding premedication 15-30m prior to exercise or exposure to very cold air  -Counseled patient on proper inhaler technique  -Counseled patient to avoid potential triggers of asthma.  -wt loss  -counseled to remain active  -Immunizations reviewed, influenza vaccination up-to-date. Provided Prevnar 13 today in clinic. Advised patient that she will need Pneumovax in 1 year.   -Advised to f/u with Cardiology - pt declines referral, pt states she will followup with who she has seen in the past, cannot recall name.  -Counseled patient to follow back up with cardiology-given her ongoing dyspnea, history of CHF exacerbation, and other comorbidities  -I spent 11 minutes with patient regarding cessation of smoking cigarettes. I reviewed health risks of tobacco use including increased risk of MI, stroke, cancer, etc.  We reviewed various approaches to cessation including pills, patches, inhaler, gum, weaning self, \"cold turkey\", and smoking cessation classes. Patient receptive, prescribed nicotine nasal spray since patient is unable to tolerate nicotine patch adhesive, and patient on multiple antidepressants making Chantix or Wellbutrin not feasible. Follow-up and Dispositions    · Return in about 6 months (around 8/21/2020).        Orders Placed This Encounter    CT CHEST W WO CONT    Pneumococcal Conjugate vaccine - 13 valent (50 years and older)    ADMIN PNEUMOCOCCAL VACCINE  Medicare Injection Admin Charge    cpap machine kit    nicotine 10 mg/mL spry    albuterol (PROVENTIL HFA, VENTOLIN HFA, PROAIR HFA) 90 mcg/actuation inhaler       Vanessa Royal MD/MPH     Pulmonary, Critical Care Medicine  [facility]

## 2020-02-21 NOTE — PROGRESS NOTES
The pt. C/o post nasal drip which causes a cough. Prod. Of sm. Amts. Of clear or tan sputum. Occ. Streaks of bright red blood are in the sputum if she coughs long and hard. Keysha Smalls presents today for   Chief Complaint   Patient presents with    Asthma     F/U to 1/8 PFT today CT [facility] 2/13    COPD       Is someone accompanying this pt? No    Is the patient using any DME equipment during OV? O2, CPap   -[facility] for O2    Depression Screening:  3 most recent PHQ Screens 1/8/2020   Little interest or pleasure in doing things Not at all   Feeling down, depressed, irritable, or hopeless Not at all   Total Score PHQ 2 0       Learning Assessment:  Learning Assessment 1/8/2020   PRIMARY LEARNER Patient   PRIMARY LANGUAGE ENGLISH   LEARNER PREFERENCE PRIMARY DEMONSTRATION   ANSWERED BY Patient   RELATIONSHIP SELF       Abuse Screening:  Abuse Screening Questionnaire 1/8/2020   Do you ever feel afraid of your partner? N   Are you in a relationship with someone who physically or mentally threatens you? N   Is it safe for you to go home? Y       Fall Risk  Fall Risk Assessment, last 12 mths 2/21/2020   Able to walk? Yes   Fall in past 12 months? No   Fall with injury? -   Number of falls in past 12 months -   Fall Risk Score -         Coordination of Care:  1. Have you been to the ER, urgent care clinic since your last visit? Hospitalized since your last visit? No    2. Have you seen or consulted any other health care providers outside of the [address] [place] [address] since your last visit? [facility]    Medication variance in dosage/sig per patient as follows: Per med record.

## 2020-02-21 NOTE — PROGRESS NOTES
Scott Cruz is a 71 y.o. female who presents for routine immunizations. She denies any symptoms , reactions or allergies that would exclude them from being immunized today. Risks and adverse reactions were discussed and the VIS was given to them. All questions were addressed. She was observed for 10 min post injection. There were no reactions observed.     Jennifer Martin LPN

## 2020-02-21 NOTE — LETTER
2/21/20 Patient: Pasha Day YOB: 1955 Date of Visit: 2/21/2020 Isabella Guzman PA-C 
[address] [facility] [address] [facility] [address] VIA In Basket Dear Isabella Guzman PA-C, Thank you for referring Ms. Pasha Day to [address] [place] [address] for evaluation. My notes for this consultation are attached. If you have questions, please do not hesitate to call me. I look forward to following your patient along with you. Sincerely, Horacio Suarez MD

## 2020-02-21 NOTE — PATIENT INSTRUCTIONS
Vaccine Information Statement    Pneumococcal Conjugate Vaccine (PCV13): What You Need to Know    Many Vaccine Information Statements are available in Wolof and other languages. See www.immunize.org/vis  Hojas de información sobre vacunas están disponibles en español y en muchos otros idiomas. Visite www.immunize.org/vis    1. Why get vaccinated? Pneumococcal conjugate vaccine (PCV13) can prevent pneumococcal disease. Pneumococcal disease refers to any illness caused by pneumococcal bacteria. These bacteria can cause many types of illnesses, including pneumonia, which is an infection of the lungs. Pneumococcal bacteria are one of the most common causes of pneumonia. Besides pneumonia, pneumococcal bacteria can also cause:   Ear infections   Sinus infections   Meningitis (infection of the tissue covering the brain and spinal cord)   Bacteremia (bloodstream infection)    Anyone can get pneumococcal disease, but children under 3years of age, people with certain medical conditions, adults 72 years or older, and cigarette smokers are at the highest risk. Most pneumococcal infections are mild. However, some can result in long-term problems, such as brain damage or hearing loss. Meningitis, bacteremia, and pneumonia caused by pneumococcal disease can be fatal.     2. PCV13     PCV13 protects against 13 types of bacteria that cause pneumococcal disease. Infants and young children usually need 4 doses of pneumococcal conjugate vaccine, at 2, 4, 6, and 1515 months of age. In some cases, a child might need fewer than 4 doses to complete PCV13 vaccination. A dose of PCV13 is also recommended for anyone 2 years or older with certain medical conditions if they did not already receive PCV13. This vaccine may be given to adults 72 years or older based on discussions between the patient and health care provider.     3. Talk with your health care provider    Tell your vaccine provider if the person getting the vaccine:   Has had an allergic reaction after a previous dose of PCV13, to an earlier pneumococcal conjugate vaccine known as PCV7, or to any vaccine containing diphtheria toxoid (for example, DTaP), or has any severe, life-threatening allergies. In some cases, your health care provider may decide to postpone PCV13 vaccination to a future visit. People with minor illnesses, such as a cold, may be vaccinated. People who are moderately or severely ill should usually wait until they recover before getting PCV13. Your health care provider can give you more information. 4. Risks of a vaccine reaction     Redness, swelling, pain, or tenderness where the shot is given, and fever, loss of appetite, fussiness (irritability), feeling tired, headache, and chills can happen after PCV13. Benedicto Solitario children may be at increased risk for seizures caused by fever after PCV13 if it is administered at the same time as inactivated influenza vaccine. Ask your health care provider for more information. People sometimes faint after medical procedures, including vaccination. Tell your provider if you feel dizzy or have vision changes or ringing in the ears. As with any medicine, there is a very remote chance of a vaccine causing a severe allergic reaction, other serious injury, or death. 5. What if there is a serious problem? An allergic reaction could occur after the vaccinated person leaves the clinic. If you see signs of a severe allergic reaction (hives, swelling of the face and throat, difficulty breathing, a fast heartbeat, dizziness, or weakness), call 9-1-1 and get the person to the nearest hospital.    For other signs that concern you, call your health care provider. Adverse reactions should be reported to the Vaccine Adverse Event Reporting System (VAERS). Your health care provider will usually file this report, or you can do it yourself. Visit the VAERS website at www.vaers. hhs.gov or call 6-322.799.3397. VAERS is only for reporting reactions, and [facility] staff do not give medical advice. 6. The National Vaccine Injury Compensation Program    The [facility] Vaccine Injury Compensation Program (VICP) is a federal program that was created to compensate people who may have been injured by certain vaccines. Visit the VICP website at www.[facility].gov/vaccinecompensation or call 2-424.274.4361 to learn about the program and about filing a claim. There is a time limit to file a claim for compensation. 7. How can I learn more?  Ask your health care provider.  Call your local or state health department.  Contact the Centers for Disease Control and Prevention (CDC):  - Call 9-121.294.7608 (1-800-CDC-INFO) or  - Visit CDCs website at www.cdc.gov/vaccines    Vaccine Information Statement (Interim)  PCV13   10/30/2019  42 ELA Yanez [id number]   Department of Health and Human Services  Centers for Disease Control and Prevention    Office Use Only

## 2020-04-09 ENCOUNTER — TELEPHONE (OUTPATIENT)
Dept: PULMONOLOGY | Age: 65
End: 2020-04-09

## 2020-04-09 DIAGNOSIS — J45.40 MODERATE PERSISTENT ASTHMA WITHOUT COMPLICATION: ICD-10-CM

## 2020-04-09 DIAGNOSIS — Z01.811 PREOP PULMONARY/RESPIRATORY EXAM: ICD-10-CM

## 2020-04-09 DIAGNOSIS — Z99.89 OSA ON CPAP: ICD-10-CM

## 2020-04-09 DIAGNOSIS — R06.02 SHORTNESS OF BREATH: ICD-10-CM

## 2020-04-09 DIAGNOSIS — G47.33 OSA ON CPAP: ICD-10-CM

## 2020-04-09 DIAGNOSIS — R06.09 DYSPNEA ON EXERTION: ICD-10-CM

## 2020-04-09 DIAGNOSIS — J44.9 COPD, GROUP B, BY GOLD 2017 CLASSIFICATION (HCC): ICD-10-CM

## 2020-04-09 DIAGNOSIS — E66.01 MORBID OBESITY (HCC): ICD-10-CM

## 2020-04-09 RX ORDER — BUDESONIDE AND FORMOTEROL FUMARATE DIHYDRATE 160; 4.5 UG/1; UG/1
2 AEROSOL RESPIRATORY (INHALATION) 2 TIMES DAILY
Qty: 1 INHALER | Refills: 11 | Status: SHIPPED | OUTPATIENT
Start: 2020-04-09 | End: 2021-03-24

## 2020-08-17 ENCOUNTER — TELEPHONE (OUTPATIENT)
Dept: PULMONOLOGY | Age: 65
End: 2020-08-17

## 2021-02-01 DIAGNOSIS — J44.9 COPD, GROUP B, BY GOLD 2017 CLASSIFICATION (HCC): ICD-10-CM

## 2021-02-01 DIAGNOSIS — G47.33 OSA ON CPAP: ICD-10-CM

## 2021-02-01 DIAGNOSIS — R91.8 PULMONARY NODULES: ICD-10-CM

## 2021-02-01 DIAGNOSIS — E66.01 MORBID OBESITY (HCC): ICD-10-CM

## 2021-02-01 DIAGNOSIS — J44.9 COPD WITH ASTHMA (HCC): Chronic | ICD-10-CM

## 2021-02-01 DIAGNOSIS — R91.8 PULMONARY INFILTRATES: ICD-10-CM

## 2021-02-01 DIAGNOSIS — Z99.89 OSA ON CPAP: ICD-10-CM

## 2021-02-01 RX ORDER — MONTELUKAST SODIUM 10 MG/1
10 TABLET ORAL DAILY
Qty: 90 TAB | Refills: 1 | Status: SHIPPED | OUTPATIENT
Start: 2021-02-01

## 2021-07-28 PROBLEM — R49.0 HOARSENESS: Status: ACTIVE | Noted: 2021-07-28

## 2022-03-18 PROBLEM — R49.0 HOARSENESS: Status: ACTIVE | Noted: 2021-07-28

## 2022-03-18 PROBLEM — E66.01 OBESITY, MORBID (HCC): Status: ACTIVE | Noted: 2019-03-14

## 2022-03-19 PROBLEM — E11.9 CONTROLLED TYPE 2 DIABETES MELLITUS WITHOUT COMPLICATION, WITHOUT LONG-TERM CURRENT USE OF INSULIN (HCC): Status: ACTIVE | Noted: 2019-11-11

## 2023-01-31 RX ORDER — LANSOPRAZOLE 15 MG/1
CAPSULE, DELAYED RELEASE ORAL
COMMUNITY
Start: 2019-09-20

## 2023-01-31 RX ORDER — ATORVASTATIN CALCIUM 20 MG/1
20 TABLET, FILM COATED ORAL DAILY
COMMUNITY
Start: 2019-12-17

## 2023-01-31 RX ORDER — SPIRONOLACTONE 25 MG/1
25 TABLET ORAL DAILY
COMMUNITY

## 2023-01-31 RX ORDER — FUROSEMIDE 20 MG/1
20 TABLET ORAL DAILY
COMMUNITY
Start: 2019-10-29

## 2023-01-31 RX ORDER — CHOLECALCIFEROL (VITAMIN D3) 125 MCG
10 CAPSULE ORAL
COMMUNITY

## 2023-01-31 RX ORDER — LORATADINE 10 MG/1
10 TABLET ORAL DAILY
COMMUNITY

## 2023-01-31 RX ORDER — TRAZODONE HYDROCHLORIDE 100 MG/1
200 TABLET ORAL
COMMUNITY

## 2023-01-31 RX ORDER — MONTELUKAST SODIUM 10 MG/1
10 TABLET ORAL DAILY
COMMUNITY
Start: 2021-02-01

## 2023-01-31 RX ORDER — ALBUTEROL SULFATE 90 UG/1
1-2 AEROSOL, METERED RESPIRATORY (INHALATION) EVERY 4 HOURS PRN
COMMUNITY
Start: 2020-02-21

## 2023-01-31 RX ORDER — FLUTICASONE PROPIONATE 50 MCG
2 SPRAY, SUSPENSION (ML) NASAL DAILY
COMMUNITY

## 2023-01-31 RX ORDER — NYSTATIN 100000 U/G
100000 CREAM TOPICAL 2 TIMES DAILY PRN
COMMUNITY

## 2023-01-31 RX ORDER — VENLAFAXINE 75 MG/1
75 TABLET ORAL DAILY
COMMUNITY

## 2023-01-31 RX ORDER — BACLOFEN 10 MG/1
10 TABLET ORAL 3 TIMES DAILY
COMMUNITY
Start: 2019-11-22

## 2023-01-31 RX ORDER — LISINOPRIL AND HYDROCHLOROTHIAZIDE 20; 12.5 MG/1; MG/1
1 TABLET ORAL DAILY
COMMUNITY
Start: 2019-09-08

## 2023-01-31 RX ORDER — FLUTICASONE FUROATE, UMECLIDINIUM BROMIDE AND VILANTEROL TRIFENATATE 200; 62.5; 25 UG/1; UG/1; UG/1
1 POWDER RESPIRATORY (INHALATION) DAILY
COMMUNITY

## 2023-01-31 RX ORDER — TRIAMCINOLONE ACETONIDE 0.25 MG/G
CREAM TOPICAL 2 TIMES DAILY PRN
COMMUNITY
